# Patient Record
Sex: MALE | Race: BLACK OR AFRICAN AMERICAN | NOT HISPANIC OR LATINO | Employment: FULL TIME | URBAN - METROPOLITAN AREA
[De-identification: names, ages, dates, MRNs, and addresses within clinical notes are randomized per-mention and may not be internally consistent; named-entity substitution may affect disease eponyms.]

---

## 2018-06-04 DIAGNOSIS — M41.9 SCOLIOSIS, UNSPECIFIED SCOLIOSIS TYPE, UNSPECIFIED SPINAL REGION: Primary | ICD-10-CM

## 2018-06-06 ENCOUNTER — OFFICE VISIT (OUTPATIENT)
Dept: ORTHOPEDICS | Facility: CLINIC | Age: 28
End: 2018-06-06
Payer: COMMERCIAL

## 2018-06-06 ENCOUNTER — HOSPITAL ENCOUNTER (OUTPATIENT)
Dept: RADIOLOGY | Facility: HOSPITAL | Age: 28
Discharge: HOME | End: 2018-06-06
Attending: PHYSICIAN ASSISTANT
Payer: COMMERCIAL

## 2018-06-06 VITALS — BODY MASS INDEX: 21.55 KG/M2 | WEIGHT: 150.56 LBS | HEIGHT: 70 IN

## 2018-06-06 DIAGNOSIS — M41.9 SCOLIOSIS, UNSPECIFIED SCOLIOSIS TYPE, UNSPECIFIED SPINAL REGION: ICD-10-CM

## 2018-06-06 DIAGNOSIS — M41.9 SCOLIOSIS, UNSPECIFIED SCOLIOSIS TYPE, UNSPECIFIED SPINAL REGION: Primary | ICD-10-CM

## 2018-06-06 PROCEDURE — 99204 OFFICE O/P NEW MOD 45 MIN: CPT | Mod: S$GLB,,, | Performed by: PHYSICIAN ASSISTANT

## 2018-06-06 PROCEDURE — 99999 PR PBB SHADOW E&M-EST. PATIENT-LVL III: CPT | Mod: PBBFAC,,, | Performed by: PHYSICIAN ASSISTANT

## 2018-06-06 PROCEDURE — 3008F BODY MASS INDEX DOCD: CPT | Mod: CPTII,S$GLB,, | Performed by: PHYSICIAN ASSISTANT

## 2018-06-06 PROCEDURE — 72082 X-RAY EXAM ENTIRE SPI 2/3 VW: CPT | Mod: TC

## 2018-06-06 PROCEDURE — 72082 X-RAY EXAM ENTIRE SPI 2/3 VW: CPT | Mod: 26,,, | Performed by: RADIOLOGY

## 2018-06-06 RX ORDER — DOXYCYCLINE 100 MG/1
CAPSULE ORAL
Refills: 3 | COMMUNITY
Start: 2018-05-25 | End: 2021-02-23

## 2018-06-06 RX ORDER — MELOXICAM 15 MG/1
15 TABLET ORAL DAILY
Qty: 30 TABLET | Refills: 0 | Status: SHIPPED | OUTPATIENT
Start: 2018-06-06 | End: 2018-07-06

## 2018-06-06 NOTE — PROGRESS NOTES
DATE: 6/6/2018  PATIENT: Maxx Neff    Supervising Physician: Maco Zarco M.D.    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 The pain has been present for 7 months.  He started having pain in November and went to his PCP who told him he had scoliosis.  He has never been diagnosed with scoliosis before.  He has no known family history of scoliosis.   The patient describes the pain as burning, shooting and throbbing.  The pain is worse with walking and bending and improved by yoga. There is no associated numbness and tingling. There is subjective weakness. Prior treatments have included ibuprofen and yoga, but no physical therapy, ESIs or surgery.    The patient denies myelopathic symptoms such as handwriting changes or difficulty with buttons/coins/keys. Denies perineal paresthesias, bowel/bladder dysfunction.    PAST MEDICAL/SURGICAL HISTORY:  Past Medical History:   Diagnosis Date    Keloid cicatrix      History reviewed. No pertinent surgical history.    Current Medications:   Current Outpatient Prescriptions:     doxycycline (VIBRAMYCIN) 100 MG Cap, TK 1 C PO QD AS DIRECTED, Disp: , Rfl: 3    meloxicam (MOBIC) 15 MG tablet, Take 1 tablet (15 mg total) by mouth once daily., Disp: 30 tablet, Rfl: 0    Social History:   Social History     Social History    Marital status: Single     Spouse name: N/A    Number of children: N/A    Years of education: N/A     Occupational History    Student      Social History Main Topics    Smoking status: Never Smoker    Smokeless tobacco: Not on file    Alcohol use No    Drug use: No    Sexual activity: Not on file     Other Topics Concern    Not on file     Social History Narrative    No narrative on file       REVIEW OF SYSTEMS:  Constitution: Negative. Negative for chills, fever and night sweats.   Cardiovascular: Negative for chest pain and syncope.  "  Respiratory: Negative for cough and shortness of breath.   Gastrointestinal: See HPI. Negative for nausea/vomiting. Negative for abdominal pain.  Genitourinary: See HPI. Negative for discoloration or dysuria.  Skin: Negative for dry skin, itching and rash.   Hematologic/Lymphatic: Negative for bleeding problem. Does not bruise/bleed easily.   Musculoskeletal: Negative for falls and muscle weakness.   Neurological: See HPI. No seizures.   Endocrine: Negative for polydipsia, polyphagia and polyuria.   Allergic/Immunologic: Negative for hives and persistent infections.    PHYSICAL EXAMINATION:    Ht 5' 10" (1.778 m)   Wt 68.3 kg (150 lb 9.2 oz)   BMI 21.61 kg/m²     General: The patient is a very pleasant 27 y.o. male in no apparent distress, the patient is oriented to person, place and time.   Psych: Normal mood and affect  HEENT: Vision grossly intact, hearing intact to the spoken word.  Lungs: Respirations unlabored.  Gait: Normal station and gait, no difficulty with toe or heel walk.   Skin: Dorsal lumbar skin negative for rashes, lesions, hairy patches and surgical scars.  Range of motion: Lumbar range of motion is acceptable. There is mild lumbar tenderness to palpation.  Spinal Balance: Global saggital and coronal spinal balance acceptable, there is a right thoracolumbar prominence.  There is scoliosis.  Musculoskeletal: No pain with the range of motion of the bilateral hips. No trochanteric tenderness to palpation.  Vascular: Bilateral lower extremities warm and well perfused, Dorsalis pedis pulses 2+ bilaterally.  Neurological: Normal strength and tone in all major motor groups in the bilateral lower extremities. Normal sensation to light touch in the L2-S1 dermatomes bilaterally.  Deep tendon reflexes symmetric 2+ in the bilateral lower extremities.  Negative Babinski bilaterally.  Straight leg raise negative bilaterally.     IMAGING:   Today I personally reviewed scoli films which show a 40 degree apex " left lumbar curvature.     ASSESSMENT/PLAN:    Diagnoses and all orders for this visit:    Scoliosis, unspecified scoliosis type, unspecified spinal region  -     Ambulatory Referral to Physical/Occupational Therapy    Other orders  -     meloxicam (MOBIC) 15 MG tablet; Take 1 tablet (15 mg total) by mouth once daily.    Discussed with Dr. Zarco.  The patient is not interested in surgical intervention at this time.  Will try some conservative treatment first.  Referral for PT placed today.  Follow up in 6 weeks after therapy.          Follow-up if symptoms worsen or fail to improve.

## 2018-06-13 ENCOUNTER — CLINICAL SUPPORT (OUTPATIENT)
Dept: REHABILITATION | Facility: HOSPITAL | Age: 28
End: 2018-06-13
Attending: PHYSICIAN ASSISTANT
Payer: COMMERCIAL

## 2018-06-13 DIAGNOSIS — M54.5 LOW BACK PAIN, UNSPECIFIED BACK PAIN LATERALITY, UNSPECIFIED CHRONICITY, WITH SCIATICA PRESENCE UNSPECIFIED: ICD-10-CM

## 2018-06-13 PROBLEM — M54.50 LOW BACK PAIN: Status: ACTIVE | Noted: 2018-06-13

## 2018-06-13 PROCEDURE — 97110 THERAPEUTIC EXERCISES: CPT

## 2018-06-13 PROCEDURE — 97161 PT EVAL LOW COMPLEX 20 MIN: CPT

## 2018-06-13 NOTE — PLAN OF CARE
"                                                    Physical Therapy Initial Evaluation     Name: Maxx Neff  Clinic Number: [id number]    Diagnosis:   Encounter Diagnosis   Name Primary?    Low back pain, unspecified back pain laterality, unspecified chronicity, with sciatica presence unspecified      Physician: Soledad Gill PA-C  Treatment Orders: PT Eval and Treat  Past Medical History:   Diagnosis Date    Keloid cicatrix      Current Outpatient Prescriptions   Medication Sig    doxycycline (VIBRAMYCIN) 100 MG Cap TK 1 C PO QD AS DIRECTED    meloxicam (MOBIC) 15 MG tablet Take 1 tablet (15 mg total) by mouth once daily.     No current facility-administered medications for this visit.      Review of patient's allergies indicates:  No Known Allergies    Time In: 4:00 pm   Time Out: 4:45 pm     Evaluation Date: 6/13/18  Visit # authorized: 20  Authorization period: 12/31/18  Plan of care Expiration: 7/25/18    Subjective     Patient reports in November 2017 his mattress "went bad" so pt replaced it with a new one. The pain persisted after he got a new mattress so he went to [facility]. He found out he had scoliosis when he went to [facility]. 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 Pain is mostly local the the back, but when it is severe it will radiate into R hip and thigh. Pt reports he has felt his toes tingle when the pain was very severe, but that has only happened a couple of times. Pain is almost exclusively on the R side of low back. Pt has not had any surgeries or injections in spine. Pt denies all red flags.   Diagnostic Imaging: xray: Dextroscoliosis in the lower thoracic spine and levoscoliosis with a rotary component and lumbar spine.  Measurements to be made by orthopedics.    Pain Scale: Maxx rates pain on a scale of 0-10 to be 4 at worst; 1 currently; 0 at best .  Onset: sudden  Aggravating factors: heavy lifting, bending forward, " "prolonged static positions   Easing factors:  Moving around, staying active   Prior Therapy: none  Functional Deficits Leading to Referral: pain and limitations with functional mobility  Prior functional status: independent   Occupation:  Teacher                        Pts goals:  Learn exercises to help his back and decrease pain     Objective     Posture Alignment: slouched posture, elevated L iliac crest, R rib hump with lumbar flexion    Palpation: no TTP    LUMBAR SPINE AROM:   Flexion: 25% pain   Extension: 50% pain   Left Sidebend: 75% pain   Right Sidebend: 50% pain   Left Rotation: 25%   Right Rotation: 10%     SEGMENTAL MOBILITY: decreased     LOWER EXTREMITY STRENGTH:   Left Right   Quadriceps 5/5 4+/5   Hamstrings 5/5 5/5     Iliopsoas 4/5 4+/5   PGM 5/5 5/5   Hip IR 5/5 5/5   Hip ER 4+/5 4+/5   Hip Ext 5/5 5/5     Dermatomes: Sensation: Light Touch: Intact    FLEXIBILITY: tight hamstrings, piriformis     GAIT: Maxx displays normal gait    Pt/family was provided educational information, including: role of PT, goals for PT, scheduling - pt verbalized understanding. Discussed insurance limitations with pt.     TREATMENT     PT Evaluation Completed? Yes  Discussed Plan of Care with patient: Yes    Maxx received 10 minutes of therapeutic exercise including:  PPT 2x10 5"  Piriformis stretch 3x30"  Scapular retraction 2x10 5"    Written Home Exercises Provided: exercises listed above (see handout)   Maxx demo good understanding of the education provided. Patient demo good return demo of skill of exercises.    Assessment     History  Co-morbidities and personal factors that may impact the plan of care Examination  Body Structures and Functions, activity limitations and participation restrictions that may impact the plan of care    Clinical Presentation   Co-morbidities:   none        Personal Factors:   no deficits Body Regions:   back    Body Systems:    gross symmetry  ROM  strength  gross " coordinated movement  transfers  transitions            Participation Restrictions:   Pain with functional mobility      Activity limitations:   Learning and applying knowledge  no deficits    General Tasks and Commands  no deficits    Communication  no deficits    Mobility  lifting and carrying objects    Self care  washing oneself (bathing, drying, washing hands)  dressing  no deficits    Domestic Life  doing house work (cleaning house, washing dishes, laundry)    Interactions/Relationships  no deficits    Life Areas  no deficits    Community and Social Life  community life  recreation and leisure         stable and uncomplicated                      low   low  low Decision Making/ Complexity Score:  low     Patient with scoliosis presents with decreased strength, decreased ROM, poor postural awareness, and subsequent functional limitations. Pt will benefit from PT to address these deficits. Good tolerance to exercises today with no adverse reaction.   Pt prognosis is Good.  Pt will benefit from skilled outpatient physical therapy to address the above stated deficits, provide pt/family education and to maximize pt's level of independence.     Medical necessity is demonstrated by the following IMPAIRMENTS/PROBLEMS:  1. Increased Pain  2. Decreased Segmental Mobility & Decreased ROM  3. Decreased Core & BLE strength  4. Decreased Flexibility BLE  5. Decreased Tolerance to Functional Activities    Pt's spiritual, cultural and educational needs considered and pt agreeable to plan of care and goals as stated below:     Anticipated Barriers for physical therapy: scoliosis     Short Term GOALS: 3 weeks. Pt agrees with goals set.  1. Patient demonstrates independence with HEP.   2. Patient demonstrates independence with Postural Awareness.   3. Patient demonstrates independence with body mechanics.     Long Term GOALS: 6 weeks. Pt agrees with goals set.  1. Patient demonstrates increased lumbar AROM by 25% to improve  tolerance to functional activities.   2. Patient demonstrates increased strength BLE's to 5/5 or greater to improve tolerance to functional activities.   3. Patient demonstrates improved overall function per FOTO Lumbar Survey to 33% or less.     Functional Limitations Reports   Tool: FOTO Lumbar Survey  Score: 43% Limitation    PLAN     Outpatient physical therapy 2 times weekly to include: pt ed, hep, therapeutic exercises, neuromuscular re-education/ balance exercises, joint mobilizations, aquatic therapy and modalities prn. Cont PT for  6 weeks. Pt may be seen by PTA as part of the rehabilitation team.     Therapist: Ahmet Cleveland, PT  6/13/2018

## 2018-06-19 ENCOUNTER — CLINICAL SUPPORT (OUTPATIENT)
Dept: REHABILITATION | Facility: HOSPITAL | Age: 28
End: 2018-06-19
Attending: PHYSICIAN ASSISTANT
Payer: COMMERCIAL

## 2018-06-19 DIAGNOSIS — M54.5 LOW BACK PAIN, UNSPECIFIED BACK PAIN LATERALITY, UNSPECIFIED CHRONICITY, WITH SCIATICA PRESENCE UNSPECIFIED: Primary | ICD-10-CM

## 2018-06-19 PROCEDURE — 97110 THERAPEUTIC EXERCISES: CPT

## 2018-06-19 NOTE — PROGRESS NOTES
"                                                    Physical Therapy Daily Note     Name: Maxx Neff  Clinic Number: [id number]  Diagnosis:   Encounter Diagnosis   Name Primary?    Low back pain, unspecified back pain laterality, unspecified chronicity, with sciatica presence unspecified Yes     Physician: Soledad Gill PA-C  Precautions: scoliosis  Visit #: 2 of 12  PTA Visit #: 1  Time In: 8:33 a  Time Out: 9:42 a  Total Treatment Time 1:1: 30    Evaluation Date: 6/13/18  Plan of care Expiration: 7/25/18      Subjective     Pt reports: no pain right now  Pain Scale: Maxx rates pain at LB on a scale of 0-10 to be 0 currently.    Objective     Maxx received individual therapeutic exercises to develop strength, ROM, flexibility, posture and core stabilization for 59 minutes including:  Bike x 5 min  Rows OTB 2x10  Shoulder ext OTB 2x10  Core walkouts GSC 10x  Shuttle 2 c 2x10    HS S 3x30"  PF S 3x30"  R SAQ 2x10 #2  LTR 10x10"  DKTC 3x10  PPT 2x10  SLR 2x10  CLams 2x10  Prone Press up 2x10        Maxx received the following manual therapy techniques: NT      The patient received the following direct contact modalities after being cleared for contraindications:HP to LS x 10 min      Written Home Exercises Provided: as per eval  Pt demo good understanding of the education provided. Maxx demonstrated good return demonstration of activities.     Education provided re: DOMS, performing prone press ups  Maxx verbalized good understanding of education provided.   No spiritual or educational barriers to learning provided    Assessment     Patient tolerated te well w/o adverse reaction. Pt felt a little discomfort LB w/ shoulder ext. Pt states that prone ext help to alleviate pain. ED pt on performing prone press ups throughout the day. Pt will benefit from skilled PT.  This is a 27 y.o. male referred to outpatient phLBPysical therapy and presents with a medical diagnosis of LBP and demonstrates " limitations as described in the problem list. Pt prognosis is Good. Pt will continue to benefit from skilled outpatient physical therapy to address the deficits listed in the problem list, provide pt/family education and to maximize pt's level of independence in the home and community environment.     Goals as follows:  Short Term GOALS: 3 weeks. Pt agrees with goals set.  1. Patient demonstrates independence with HEP.   2. Patient demonstrates independence with Postural Awareness.   3. Patient demonstrates independence with body mechanics.      Long Term GOALS: 6 weeks. Pt agrees with goals set.  1. Patient demonstrates increased lumbar AROM by 25% to improve tolerance to functional activities.   2. Patient demonstrates increased strength BLE's to 5/5 or greater to improve tolerance to functional activities.   3. Patient demonstrates improved overall function per FOTO Lumbar Survey to 33% or less.         Plan     Continue with established Plan of Care towards PT goals.    Therapist: Kristina Samayoa PTA  6/19/2018    Ahmet Cleveland PT and Kristina Samayoa PTA met face to face to discuss patient's treatment plan and progress towards established goals.  Treatment will be continued as described in initial report/eval and progress notes. Patient will be seen by physical therapist every sixth visit and minimally once per month.

## 2018-06-22 ENCOUNTER — CLINICAL SUPPORT (OUTPATIENT)
Dept: REHABILITATION | Facility: HOSPITAL | Age: 28
End: 2018-06-22
Attending: PHYSICIAN ASSISTANT
Payer: COMMERCIAL

## 2018-06-22 DIAGNOSIS — M54.5 LOW BACK PAIN, UNSPECIFIED BACK PAIN LATERALITY, UNSPECIFIED CHRONICITY, WITH SCIATICA PRESENCE UNSPECIFIED: Primary | ICD-10-CM

## 2018-06-22 PROCEDURE — 97110 THERAPEUTIC EXERCISES: CPT

## 2018-06-22 NOTE — PROGRESS NOTES
Physical Therapy Daily Note     Name: Maxx Neff  Clinic Number: [id number]  Diagnosis:   Encounter Diagnosis   Name Primary?    Low back pain, unspecified back pain laterality, unspecified chronicity, with sciatica presence unspecified Yes     Physician: Soledad Gill PA-C  Precautions: scoliosis  Visit #: 3 of 12  PTA Visit #: 2  Time In: 8:27 a  Time Out: 8:33 a  Total Treatment Time 1:1: 38    Evaluation Date: 6/13/18  Plan of care Expiration: 7/25/18      Subjective     Pt reports: no pain, little pain after last session. Better after prone press ups  Pain Scale: Maxx rates pain at LB on a scale of 0-10 to be 0 currently.    Objective     Maxx received individual therapeutic exercises to develop strength, ROM, flexibility, posture and core stabilization for 56 minutes including:  Bike x 8 min lvl2  Rows OTB 3x10  Shoulder ext OTB 3x10  Core walkouts GSC 10x2  Shuttle 2 c 3x10    HS S 2x1'  PF S 2x1'  R SAQ 3x10 #2  DKTC 3x10  PPT 3x10  SLR 3x10  CLams 3x10  Prone Press up 3x10        Maxx received the following manual therapy techniques: NT      The patient received the following direct contact modalities after being cleared for contraindications:HP to LS x 10 min      Written Home Exercises Provided: as per eval  Pt demo good understanding of the education provided. Maxx demonstrated good return demonstration of activities.     Education provided re: DOMS, performing prone press ups  Maxx verbalized good understanding of education provided.   No spiritual or educational barriers to learning provided    Assessment     Patient tolerated te well w/o adverse reaction. LTR taken out 2* causing increase symptoms last session. VC needed to not rot hips w/ clams. Increased reps today. Cont to benefit from skilled PT  This is a 27 y.o. male referred to outpatient phLBPysical therapy and presents with a medical diagnosis of LBP and  demonstrates limitations as described in the problem list. Pt prognosis is Good. Pt will continue to benefit from skilled outpatient physical therapy to address the deficits listed in the problem list, provide pt/family education and to maximize pt's level of independence in the home and community environment.     Goals as follows:  Short Term GOALS: 3 weeks. Pt agrees with goals set.  1. Patient demonstrates independence with HEP.   2. Patient demonstrates independence with Postural Awareness.   3. Patient demonstrates independence with body mechanics.      Long Term GOALS: 6 weeks. Pt agrees with goals set.  1. Patient demonstrates increased lumbar AROM by 25% to improve tolerance to functional activities.   2. Patient demonstrates increased strength BLE's to 5/5 or greater to improve tolerance to functional activities.   3. Patient demonstrates improved overall function per FOTO Lumbar Survey to 33% or less.         Plan     Continue with established Plan of Care towards PT goals.    Therapist: Kristina Samayoa PTA  6/22/2018    Ahmet Cleveland PT and Kristina Samayoa PTA met face to face to discuss patient's treatment plan and progress towards established goals.  Treatment will be continued as described in initial report/eval and progress notes. Patient will be seen by physical therapist every sixth visit and minimally once per month.

## 2018-06-26 ENCOUNTER — CLINICAL SUPPORT (OUTPATIENT)
Dept: REHABILITATION | Facility: HOSPITAL | Age: 28
End: 2018-06-26
Attending: PHYSICIAN ASSISTANT
Payer: COMMERCIAL

## 2018-06-26 DIAGNOSIS — M54.5 LOW BACK PAIN, UNSPECIFIED BACK PAIN LATERALITY, UNSPECIFIED CHRONICITY, WITH SCIATICA PRESENCE UNSPECIFIED: Primary | ICD-10-CM

## 2018-06-26 PROCEDURE — 97110 THERAPEUTIC EXERCISES: CPT

## 2018-06-26 NOTE — PROGRESS NOTES
Physical Therapy Daily Note     Name: Maxx Neff  Clinic Number: [id number]  Diagnosis:   Encounter Diagnosis   Name Primary?    Low back pain, unspecified back pain laterality, unspecified chronicity, with sciatica presence unspecified Yes     Physician: Soledad Gill PA-C  Precautions: scoliosis  Visit #: 4 of 12  PTA Visit #: 3  Time In: 7:54 a  Time Out: 9:14 a  Total Treatment Time 1:1: 30    Evaluation Date: 6/13/18  Plan of care Expiration: 7/25/18      Subjective     Pt reports: LB getting better. Little pain getting out of bed this am but not pain now  Pain Scale: Maxx rates pain at LB on a scale of 0-10 to be 0 currently.    Objective     Maxx received individual therapeutic exercises to develop strength, ROM, flexibility, posture and core stabilization for 68  minutes including:  Bike x 8 min lvl3  Rows GTB 3x10  Shoulder ext GTB 3x10  Core walkouts GSC 10x2  Shuttle 2 c 3x10    HS S 2x1'  PF S 2x1'  R SAQ 3x10 #2  DKTC 3x10  PPT 3x10  SLR 3x10  CLams 3x10  Prone Press up 3x10        Maxx received the following manual therapy techniques: NT      The patient received the following direct contact modalities after being cleared for contraindications:HP to LS x 10 min      Written Home Exercises Provided: as per eval  Pt demo good understanding of the education provided. Maxx demonstrated good return demonstration of activities.     Education provided re: DOMS, performing prone press ups  Maxx verbalized good understanding of education provided.   No spiritual or educational barriers to learning provided    Assessment     Patient tolerated te well w/o adverse reaction. Pt mainly c/o pain when he rot to R. Cont to have improvement w/ decreased pain. Pt can be advanced next session as sharan. Increased band resistance today. Cont to benefit from skilled PT  This is a 27 y.o. male referred to outpatient phLBPysical therapy and presents  with a medical diagnosis of LBP and demonstrates limitations as described in the problem list. Pt prognosis is Good. Pt will continue to benefit from skilled outpatient physical therapy to address the deficits listed in the problem list, provide pt/family education and to maximize pt's level of independence in the home and community environment.     Goals as follows:  Short Term GOALS: 3 weeks. Pt agrees with goals set.  1. Patient demonstrates independence with HEP.   2. Patient demonstrates independence with Postural Awareness.   3. Patient demonstrates independence with body mechanics.      Long Term GOALS: 6 weeks. Pt agrees with goals set.  1. Patient demonstrates increased lumbar AROM by 25% to improve tolerance to functional activities.   2. Patient demonstrates increased strength BLE's to 5/5 or greater to improve tolerance to functional activities.   3. Patient demonstrates improved overall function per FOTO Lumbar Survey to 33% or less.         Plan     Continue with established Plan of Care towards PT goals.    Therapist: Kristina Samayoa PTA  6/26/2018    Ahmet Cleveland PT and Kristina Samayoa PTA met face to face to discuss patient's treatment plan and progress towards established goals.  Treatment will be continued as described in initial report/eval and progress notes. Patient will be seen by physical therapist every sixth visit and minimally once per month.

## 2018-06-28 ENCOUNTER — CLINICAL SUPPORT (OUTPATIENT)
Dept: REHABILITATION | Facility: HOSPITAL | Age: 28
End: 2018-06-28
Attending: PHYSICIAN ASSISTANT
Payer: COMMERCIAL

## 2018-06-28 DIAGNOSIS — M54.5 LOW BACK PAIN, UNSPECIFIED BACK PAIN LATERALITY, UNSPECIFIED CHRONICITY, WITH SCIATICA PRESENCE UNSPECIFIED: Primary | ICD-10-CM

## 2018-06-28 PROCEDURE — 97110 THERAPEUTIC EXERCISES: CPT

## 2018-06-28 NOTE — PROGRESS NOTES
Physical Therapy Daily Note     Name: Maxx Neff  Clinic Number: [id number]  Diagnosis:   Encounter Diagnosis   Name Primary?    Low back pain, unspecified back pain laterality, unspecified chronicity, with sciatica presence unspecified Yes     Physician: Soledad Gill PA-C  Precautions: scoliosis  Visit #: 5 of 12  PTA Visit #: 4  Time In: 8:26 a  Time Out: 9:30 a  Total Treatment Time 1:1: 36    Evaluation Date: 6/13/18  Plan of care Expiration: 7/25/18      Subjective     Pt reports: LB doing better, yoga is helping  Pain Scale: Maxx rates pain at LB on a scale of 0-10 to be 0 currently.    Objective     Maxx received individual therapeutic exercises to develop strength, ROM, flexibility, posture and core stabilization for 56 minutes including:  Bike x 8 min lvl3  Rows GTB 3x10  Shoulder ext GTB 3x10  Core walkouts GSC 10x2  Shuttle 3 c 3x10  Seated on SB march 2x10  Stand on bosu chops 15x    HS S 2x1'  PF S 2x1'  R SAQ 3x10 #2  DKTC 3x10  PPT 2x10 W/ LE ext  SLR 3x10  CLams 3x10 RTB  Prone Press up 3x10  Bridge w/ ball 2x10  QPED opp arm/leg 2x10        Maxx received the following manual therapy techniques: NT      The patient received the following direct contact modalities after being cleared for contraindications:HP to LS x 10 min      Written Home Exercises Provided: as per eval  Pt demo good understanding of the education provided. Maxx demonstrated good return demonstration of activities.     Education provided re: DOMS, performing prone press ups  Maxx verbalized good understanding of education provided.   No spiritual or educational barriers to learning provided    Assessment     Patient tolerated te well w/o adverse reaction. Pt felt the new fallon were beneficial today w/ core strengthening. VC for proper mm activation. Prone press up not performed 2* time, will perform at home.Cont to benefit from skilled PT  This is a 27  y.o. male referred to outpatient phLBPysical therapy and presents with a medical diagnosis of LBP and demonstrates limitations as described in the problem list. Pt prognosis is Good. Pt will continue to benefit from skilled outpatient physical therapy to address the deficits listed in the problem list, provide pt/family education and to maximize pt's level of independence in the home and community environment.     Goals as follows:  Short Term GOALS: 3 weeks. Pt agrees with goals set.  1. Patient demonstrates independence with HEP.   2. Patient demonstrates independence with Postural Awareness.   3. Patient demonstrates independence with body mechanics.      Long Term GOALS: 6 weeks. Pt agrees with goals set.  1. Patient demonstrates increased lumbar AROM by 25% to improve tolerance to functional activities.   2. Patient demonstrates increased strength BLE's to 5/5 or greater to improve tolerance to functional activities.   3. Patient demonstrates improved overall function per FOTO Lumbar Survey to 33% or less.         Plan     Continue with established Plan of Care towards PT goals.    Therapist: Kristina Samayoa PTA  6/28/2018    Ahmet Cleveland PT and Kristina Samayoa PTA met face to face to discuss patient's treatment plan and progress towards established goals.  Treatment will be continued as described in initial report/eval and progress notes. Patient will be seen by physical therapist every sixth visit and minimally once per month.

## 2018-07-05 ENCOUNTER — CLINICAL SUPPORT (OUTPATIENT)
Dept: REHABILITATION | Facility: HOSPITAL | Age: 28
End: 2018-07-05
Attending: PHYSICIAN ASSISTANT
Payer: COMMERCIAL

## 2018-07-05 PROCEDURE — 97110 THERAPEUTIC EXERCISES: CPT

## 2018-07-05 NOTE — PROGRESS NOTES
Physical Therapy Daily Note     Name: Maxx Neff  Clinic Number: [id number]  Diagnosis:   No diagnosis found.  Physician: Soledad Gill PA-C  Precautions: scoliosis  Visit #: 6 of 12  PTA Visit #: 5  Time In: 8:06 a  Time Out: 9:10 a  Total Treatment Time 1:1: 23    Evaluation Date: 6/13/18  Plan of care Expiration: 7/25/18      Subjective     Pt reports: LB is ok  Pain Scale: Maxx rates pain at LB on a scale of 0-10 to be 0 currently.    Objective   Foto Score: 28%    Mxax received individual therapeutic exercises to develop strength, ROM, flexibility, posture and core stabilization for 66 minutes including:  Bike x 8 min lvl3 (5 min today, running late)  Rows GTB 3x10  Shoulder ext GTB 3x10  Core walkouts GSC 10x2   Shuttle 3 c 3x10  Seated on SB march 3x10  Stand on bosu chops 30x GTB    HS S 2x1'  PF S 2x1'  R SAQ 3x10 #2  DKTC w/ SB 3x10  PPT 2x10 W/ LE ext  SLR 3x10 -NT  CLams 3x10 RTB-NT  Prone Press up 3x10-NT  Bridge w/ ball 2x10  QPED opp arm/leg 2x10        Maxx received the following manual therapy techniques: NT      The patient received the following direct contact modalities after being cleared for contraindications:HP to LS x 00 min      Written Home Exercises Provided: as per eval  Pt demo good understanding of the education provided. Maxx demonstrated good return demonstration of activities.     Education provided re: DOMS, performing prone press ups  Maxx verbalized good understanding of education provided.   No spiritual or educational barriers to learning provided    Assessment     Patient tolerated te well w/o adverse reaction. Pt still has pain w/ Rot as well as sometimes coming out of seated position. Some fallon not performed 2* time. Pt OOT next week, will return following week. Cont to benefit form skilled PT  This is a 27 y.o. male referred to outpatient phLBPysical therapy and presents with a medical diagnosis  of LBP and demonstrates limitations as described in the problem list. Pt prognosis is Good. Pt will continue to benefit from skilled outpatient physical therapy to address the deficits listed in the problem list, provide pt/family education and to maximize pt's level of independence in the home and community environment.     Goals as follows:  Short Term GOALS: 3 weeks. Pt agrees with goals set.  1. Patient demonstrates independence with HEP.   2. Patient demonstrates independence with Postural Awareness.   3. Patient demonstrates independence with body mechanics.      Long Term GOALS: 6 weeks. Pt agrees with goals set.  1. Patient demonstrates increased lumbar AROM by 25% to improve tolerance to functional activities.   2. Patient demonstrates increased strength BLE's to 5/5 or greater to improve tolerance to functional activities.   3. Patient demonstrates improved overall function per FOTO Lumbar Survey to 33% or less.         Plan     Continue with established Plan of Care towards PT goals.    Therapist: Kristina Samayoa PTA  7/5/2018    Ahmet Cleveland PT and Kristina Samayoa PTA met face to face to discuss patient's treatment plan and progress towards established goals.  Treatment will be continued as described in initial report/eval and progress notes. Patient will be seen by physical therapist every sixth visit and minimally once per month.

## 2018-09-26 ENCOUNTER — DOCUMENTATION ONLY (OUTPATIENT)
Dept: REHABILITATION | Facility: HOSPITAL | Age: 28
End: 2018-09-26

## 2018-09-26 NOTE — PROGRESS NOTES
Name: Maxx Neff  Referring Provider: SCOTT Gill  PT Order: PT evaluate and treat  Clinical #: [id number]  Discharge Summary Date: 9/26/2018  Diagnosis: LBP    Patient was seen for 6 OP PT visits from 6/13/18 to 7/5/18. Pt missed/no visit 6 scheduled sessions. Treatment included: evaluation, HEP, pt education, manual therapy, ther ex, and stretching. PT unable to fully assess goal achievement as pt did not return for follow up sessions/did not reschedule follow up visits. This patient is discharged from OP PT Services.    Soledad Poe, PT, DPT

## 2019-06-10 ENCOUNTER — OFFICE VISIT (OUTPATIENT)
Dept: PSYCHIATRY | Facility: CLINIC | Age: 29
End: 2019-06-10
Payer: COMMERCIAL

## 2019-06-10 VITALS
WEIGHT: 156.94 LBS | DIASTOLIC BLOOD PRESSURE: 63 MMHG | HEIGHT: 70 IN | HEART RATE: 67 BPM | BODY MASS INDEX: 22.47 KG/M2 | SYSTOLIC BLOOD PRESSURE: 141 MMHG

## 2019-06-10 DIAGNOSIS — F33.1 MDD (MAJOR DEPRESSIVE DISORDER), RECURRENT EPISODE, MODERATE: Primary | ICD-10-CM

## 2019-06-10 DIAGNOSIS — G47.00 INSOMNIA, UNSPECIFIED TYPE: ICD-10-CM

## 2019-06-10 PROCEDURE — 3008F PR BODY MASS INDEX (BMI) DOCUMENTED: ICD-10-PCS | Mod: CPTII,S$GLB,, | Performed by: NURSE PRACTITIONER

## 2019-06-10 PROCEDURE — 99999 PR PBB SHADOW E&M-EST. PATIENT-LVL III: CPT | Mod: PBBFAC,,, | Performed by: NURSE PRACTITIONER

## 2019-06-10 PROCEDURE — 3008F BODY MASS INDEX DOCD: CPT | Mod: CPTII,S$GLB,, | Performed by: NURSE PRACTITIONER

## 2019-06-10 PROCEDURE — 99204 PR OFFICE/OUTPT VISIT, NEW, LEVL IV, 45-59 MIN: ICD-10-PCS | Mod: S$GLB,,, | Performed by: NURSE PRACTITIONER

## 2019-06-10 PROCEDURE — 99204 OFFICE O/P NEW MOD 45 MIN: CPT | Mod: S$GLB,,, | Performed by: NURSE PRACTITIONER

## 2019-06-10 PROCEDURE — 99999 PR PBB SHADOW E&M-EST. PATIENT-LVL III: ICD-10-PCS | Mod: PBBFAC,,, | Performed by: NURSE PRACTITIONER
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
## 2019-06-10 NOTE — PATIENT INSTRUCTIONS
Melatonin oral solid dosage forms  What is this medicine?  MELATONIN (beatriz sherrill aguero) is a dietary supplement. It is mostly promoted to help maintain normal sleep patterns. The FDA has not approved this supplement for any medical use.  This supplement may be used for other purposes; ask your health care provider or pharmacist if you have questions.  How should I use this medicine?  Take this supplement by mouth with a glass of water. Do not take with food. This supplement is usually taken 1 or 2 hours before bedtime. After taking this supplement, limit your activities to those needed to prepare for bed. Some products may be chewed or dissolved in the mouth before swallowing. Some tablets or capsules must be swallowed whole; do not cut, crush or chew. Follow the directions on the package labeling, or take as directed by your health care professional. Do not take this supplement more often than directed.  Talk to your pediatrician regarding the use of this supplement in children. Special care may be needed. This supplement is not recommended for use in children without a prescription.  What side effects may I notice from receiving this medicine?  Side effects that you should report to your doctor or health care professional as soon as possible:  · allergic reactions like skin rash, itching or hives, swelling of the face, lips, or tongue  · breathing problems  · change in sex drive or performance  · confusion  · depressed mood, irritable, or other changes in moods or behaviors  · fast, irregular heartbeat  · feeling faint or lightheaded, falls  · irregular or missed menstrual periods  · leakage of milk from the nipples in a person who is not breast-feeding  · signs and symptoms of liver injury like dark yellow or brown urine; general ill feeling or flu-like symptoms; light-colored stools; loss of appetite; nausea; right upper belly pain; unusually weak or tired; yellowing of the eyes or  skin  · sleep-walking  · trouble staying awake or alert during the day  · unusual activities while you are still asleep like driving, eating, making phone calls  · unusual bleeding or bruising  Side effects that usually do not require medical attention (report to your doctor or health care professional if they continue or are bothersome):  · dizziness  · drowsiness  · headache  · tiredness  · unusual dreams or nightmares  · upset stomach  What may interact with this medicine?  Do not take this medicine with any of the following medications:  · fluvoxamine  · ramelteon  · tasimelteon  This medicine may also interact with the following medications:  · alcohol  · atazanavir  · caffeine  · carbamazepine  · certain antibiotics like ciprofloxacin, enoxacin  · certain medicines for depression, anxiety, or psychotic disturbances  · cimetidine  · female hormones, like estrogens and birth control pills, patches, rings, or injections  · methoxsalen  · nifedipine  · other medications for sleep  · other herbal or dietary supplements  · phenobarbital  · rifampin  · smoking tobacco  · tamoxifen  · treatments for cancer, organ transplant, or immune disorders  What if I miss a dose?  If you miss taking your dose at the usual time, skip that dose. If it is almost time for your next dose, take only that dose. Do not take double or extra doses.  Where should I keep my medicine?  Keep out of the reach of children.  Store at room temperature or as directed on the package label. Protect from moisture. Throw away any unused supplement after the expiration date.  What should I tell my health care provider before I take this medicine?  They need to know if you have any of these conditions:  · asthma  · cancer  · depression or mental illness  · diabetes  · hormone problems  · if you often drink alcohol  · immune system problems  · liver disease  · organ transplant  · seizure disorder  · an unusual or allergic reaction to melatonin, other  medicines, foods, dyes, or preservatives  · pregnant or trying to get pregnant  · breast-feeding  What should I watch for while using this medicine?  See your doctor if your symptoms do not get better or if they get worse. Do not take this supplement for more than 2 weeks unless your doctor tells you to.  You may get drowsy or dizzy. Do not drive, use machinery, or do anything that needs mental alertness until you know how this medicine affects you. Do not stand or sit up quickly, especially if you are an older patient. This reduces the risk of dizzy or fainting spells. Alcohol may interfere with the effect of this medicine. Avoid alcoholic drinks.  Talk to your doctor before you use this supplement if you are currently being treated for an emotional, mental, or sleep problem. This medicine may interfere with your treatment.  Herbal or dietary supplements are not regulated like medicines. Rigid  standards are not required for dietary supplements. The purity and strength of these products can vary. The safety and effect of this dietary supplement for a certain disease or illness is not well known. This product is not intended to diagnose, treat, cure or prevent any disease.  The Food and Drug Administration suggests the following to help consumers protect themselves:  · Always read product labels and follow directions.  · Natural does not mean a product is safe for humans to take.  · Look for products that include USP after the ingredient name. This means that the  followed the standards of the [place] Pharmacopoeia.  · Supplements made or sold by a nationally known food or drug company are more likely to be made under tight controls. You can write to the company for more information about how the product was made.  NOTE:This sheet is a summary. It may not cover all possible information. If you have questions about this medicine, talk to your doctor, pharmacist, or health care provider.  Copyright© 2017 [employer]

## 2019-09-05 ENCOUNTER — OFFICE VISIT (OUTPATIENT)
Dept: PSYCHIATRY | Facility: CLINIC | Age: 29
End: 2019-09-05
Payer: COMMERCIAL

## 2019-09-05 DIAGNOSIS — F32.A DEPRESSION, UNSPECIFIED DEPRESSION TYPE: Primary | ICD-10-CM

## 2019-09-05 PROCEDURE — 90791 PSYCH DIAGNOSTIC EVALUATION: CPT | Mod: S$GLB,,, | Performed by: SOCIAL WORKER

## 2019-09-05 PROCEDURE — 90791 PR PSYCHIATRIC DIAGNOSTIC EVALUATION: ICD-10-PCS | Mod: S$GLB,,, | Performed by: SOCIAL WORKER
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
## 2019-11-29 ENCOUNTER — OFFICE VISIT (OUTPATIENT)
Dept: URGENT CARE | Facility: CLINIC | Age: 29
End: 2019-11-29
Payer: COMMERCIAL

## 2019-11-29 VITALS
DIASTOLIC BLOOD PRESSURE: 75 MMHG | HEIGHT: 70 IN | SYSTOLIC BLOOD PRESSURE: 132 MMHG | BODY MASS INDEX: 22.33 KG/M2 | HEART RATE: 104 BPM | RESPIRATION RATE: 19 BRPM | OXYGEN SATURATION: 98 % | WEIGHT: 156 LBS | TEMPERATURE: 101 F

## 2019-11-29 DIAGNOSIS — K52.9 GASTROENTERITIS: Primary | ICD-10-CM

## 2019-11-29 DIAGNOSIS — R50.9 FEVER, UNSPECIFIED FEVER CAUSE: ICD-10-CM

## 2019-11-29 LAB
CTP QC/QA: YES
FLUAV AG NPH QL: NEGATIVE
FLUBV AG NPH QL: NEGATIVE

## 2019-11-29 PROCEDURE — 99214 PR OFFICE/OUTPT VISIT, EST, LEVL IV, 30-39 MIN: ICD-10-PCS | Mod: S$GLB,,, | Performed by: FAMILY MEDICINE

## 2019-11-29 PROCEDURE — 87804 POCT INFLUENZA A/B: ICD-10-PCS | Mod: QW,S$GLB,, | Performed by: FAMILY MEDICINE

## 2019-11-29 PROCEDURE — 87804 INFLUENZA ASSAY W/OPTIC: CPT | Mod: 59,QW,S$GLB, | Performed by: FAMILY MEDICINE

## 2019-11-29 PROCEDURE — 99214 OFFICE O/P EST MOD 30 MIN: CPT | Mod: S$GLB,,, | Performed by: FAMILY MEDICINE

## 2019-11-29 RX ORDER — ONDANSETRON 4 MG/1
4 TABLET, ORALLY DISINTEGRATING ORAL EVERY 8 HOURS PRN
Qty: 20 TABLET | Refills: 0 | Status: SHIPPED | OUTPATIENT
Start: 2019-11-29 | End: 2019-12-06

## 2019-11-29 RX ORDER — DIPHENOXYLATE HYDROCHLORIDE AND ATROPINE SULFATE 2.5; .025 MG/1; MG/1
1 TABLET ORAL 3 TIMES DAILY PRN
Qty: 20 TABLET | Refills: 0 | Status: SHIPPED | OUTPATIENT
Start: 2019-11-29 | End: 2019-12-06

## 2019-11-29 NOTE — PATIENT INSTRUCTIONS

## 2019-11-29 NOTE — PROGRESS NOTES
"Subjective:       Patient ID: Maxx Neff is a 29 y.o. male.    Vitals:  height is 5' 10" (1.778 m) and weight is 70.8 kg (156 lb). His oral temperature is 100.8 °F (38.2 °C) (abnormal). His blood pressure is 132/75 and his pulse is 104. His respiration is 19 and oxygen saturation is 98%.     Chief Complaint: Emesis    Emesis    This is a new problem. The current episode started today. The problem occurs 2 to 4 times per day. The emesis has an appearance of stomach contents. The maximum temperature recorded prior to his arrival was 100.4 - 100.9 F. Associated symptoms include diarrhea. Pertinent negatives include no abdominal pain, arthralgias, chest pain, chills, coughing, decreased urine volume, dizziness, fever, headaches, myalgias, sweats, URI or weight loss. He has tried nothing for the symptoms. The treatment provided no relief.       Constitution: Negative for chills, fatigue and fever.   HENT: Negative for congestion and sore throat.    Neck: Negative for painful lymph nodes.   Cardiovascular: Negative for chest pain and leg swelling.   Eyes: Negative for double vision and blurred vision.   Respiratory: Negative for cough and shortness of breath.    Gastrointestinal: Positive for vomiting and diarrhea. Negative for abdominal pain and nausea.   Genitourinary: Negative for dysuria, frequency, urgency and urine decreased.   Musculoskeletal: Negative for joint pain, joint swelling, muscle cramps and muscle ache.   Skin: Negative for color change, pale and rash.   Allergic/Immunologic: Negative for seasonal allergies.   Neurological: Negative for dizziness, history of vertigo, light-headedness, passing out and headaches.   Hematologic/Lymphatic: Negative for swollen lymph nodes, easy bruising/bleeding and history of blood clots. Does not bruise/bleed easily.   Psychiatric/Behavioral: Negative for nervous/anxious, sleep disturbance and depression. The patient is not nervous/anxious.        Objective:    "   Physical Exam   Constitutional: He is oriented to person, place, and time. He appears well-developed and well-nourished. He is cooperative.  Non-toxic appearance. He does not appear ill. No distress.   HENT:   Head: Normocephalic and atraumatic.   Right Ear: Hearing, tympanic membrane, external ear and ear canal normal.   Left Ear: Hearing, tympanic membrane, external ear and ear canal normal.   Nose: No mucosal edema, rhinorrhea or nasal deformity. No epistaxis. Right sinus exhibits no maxillary sinus tenderness and no frontal sinus tenderness. Left sinus exhibits no maxillary sinus tenderness and no frontal sinus tenderness.   Mouth/Throat: Uvula is midline, oropharynx is clear and moist and mucous membranes are normal. No trismus in the jaw. Normal dentition. No uvula swelling. No posterior oropharyngeal erythema.   Eyes: Conjunctivae and lids are normal. Right eye exhibits no discharge. Left eye exhibits no discharge. No scleral icterus.   Neck: Trachea normal, normal range of motion, full passive range of motion without pain and phonation normal. Neck supple.   Cardiovascular: Normal rate, regular rhythm, normal heart sounds, intact distal pulses and normal pulses.   Pulmonary/Chest: Effort normal and breath sounds normal. No respiratory distress. He has no wheezes. He has no rales.   Abdominal: Soft. Normal appearance and bowel sounds are normal. He exhibits no distension and no pulsatile midline mass. There is no rebound and no guarding. No hernia.   Mild mid right abdominal pain.   Musculoskeletal: Normal range of motion. He exhibits no edema or deformity.   Neurological: He is alert and oriented to person, place, and time. He exhibits normal muscle tone. Coordination normal.   Skin: Skin is warm, dry, intact, not diaphoretic and not pale.   Psychiatric: He has a normal mood and affect. His speech is normal and behavior is normal. Judgment and thought content normal. Cognition and memory are normal.    Nursing note and vitals reviewed.        Assessment:       1. Gastroenteritis    2. Fever, unspecified fever cause        Plan:         Gastroenteritis  -     ondansetron (ZOFRAN-ODT) 4 MG TbDL; Take 1 tablet (4 mg total) by mouth every 8 (eight) hours as needed (nausea/vomit).  Dispense: 20 tablet; Refill: 0  -     diphenoxylate-atropine 2.5-0.025 mg (LOMOTIL) 2.5-0.025 mg per tablet; Take 1 tablet by mouth 3 (three) times daily as needed for Diarrhea.  Dispense: 20 tablet; Refill: 0    Fever, unspecified fever cause  -     POCT Influenza A/B         Patient Instructions     Viral Gastroenteritis (Adult)    Gastroenteritis is commonly called the stomach flu. It is most often caused by a virus that affects the stomach and intestinal tract and usually lasts from 2 to 7 days. Common viruses causing gastroenteritis include norovirus, rotavirus, and hepatitis A. Non-viral causes of gastroenteritis include bacteria, parasites, and toxins.  The danger from repeated vomiting or diarrhea is dehydration. This is the loss of too much fluid from the body. When this occurs, body fluids must be replaced. Antibiotics do not help with this illness because it is usually viral.Simple home treatment will be helpful.  Symptoms of viral gastroenteritis may include:  · Watery, loose stools  · Stomach pain or abdominal cramps  · Fever and chills  · Nausea and vomiting  · Loss of bowel control  · Headache  Home care  Gastroenteritis is transmitted by contact with the stool or vomit of an infected person. This can occur from person to person or from contact with a contaminated surface.  Follow these guidelines when caring for yourself at home:  · If symptoms are severe, rest at home for the next 24 hours or until you are feeling better.  · Wash your hands with soap and water or use alcohol-based  to prevent the spread of infection. Wash your hands after touching anyone who is sick.  · Wash your hands or use alcohol-based   after using the toilet and before meals. Clean the toilet after each use.  Remember these tips when preparing food:  · People with diarrhea should not prepare or serve food to others. When preparing foods, wash your hands before and after.  · Wash your hands after using cutting boards, countertops, knives, or utensils that have been in contact with raw food.  · Keep uncooked meats away from cooked and ready-to-eat foods.  Medicine  You may use acetaminophen or NSAID medicines like ibuprofen or naproxen to control fever unless another medicine was given. If you have chronic liver or kidney disease, talk with your healthcare provider before using these medicines. Also talk with your provider if you've had a stomach ulcer or gastrointestinal bleeding. Don't give aspirin to anyone under 18 years of age who is ill with a fever. It may cause severe liver damage. Don't use NSAIDS is you are already taking one for another condition (like arthritis) or are on aspirin (such as for heart disease or after a stroke).  If medicine for vomiting or diarrhea are prescribed, take these only as directed. Do not take over-the-counter medicines for vomiting or diarrhea unless instructed by your healthcare provider.  Diet  Follow these guidelines for food:  · Water and liquids are important so you don't get dehydrated. Drink a small amount at a time or suck on ice chips if you are vomiting.  · If you eat, avoid fatty, greasy, spicy, or fried foods.  · Don't eat dairy if you have diarrhea. This can make diarrhea worse.  · Avoid tobacco, alcohol, and caffeine which may worsen symptoms.  During the first 24 hours (the first full day), follow the diet below:  · Beverages. Sports drinks, soft drinks without caffeine, ginger ale, mineral water (plain or flavored), decaffeinated tea and coffee. If you are very dehydrated, sports drinks aren't a good choice. They have too much sugar and not enough electrolytes. In this case,  commercially available products called oral rehydration solutions, are best.  · Soups. Eat clear broth, consommé, and bouillon.  · Desserts. Eat gelatin, popsicles, and fruit juice bars.  During the next 24 hours (the second day), you may add the following to the above:  · Hot cereal, plain toast, bread, rolls, and crackers  · Plain noodles, rice, mashed potatoes, chicken noodle or rice soup  · Unsweetened canned fruit (avoid pineapple), bananas  · Limit fat intake to less than 15 grams per day. Do this by avoiding margarine, butter, oils, mayonnaise, sauces, gravies, fried foods, peanut butter, meat, poultry, and fish.  · Limit fiber and avoid raw or cooked vegetables, fresh fruits (except bananas), and bran cereals.  · Limit caffeine and chocolate. Don't use spices or seasonings other than salt.  · Limit dairy products.  · Avoid alcohol.  During the next 24 hours:  · Gradually resume a normal diet as you feel better and your symptoms improve.  · If at any time it starts getting worse again, go back to clear liquids until you feel better.  Follow-up care  Follow up with your healthcare provider, or as advised. Call your provider if you don't get better within 24 hours or if diarrhea lasts more than a week. Also follow up if you are unable to keep down liquids and get dehydrated. If a stool (diarrhea) sample was taken, call as directed for the results.  Call 911  Call 911 if any of these occur:  · Trouble breathing  · Chest pain  · Confused  · Severe drowsiness or trouble awakening  · Fainting or loss of consciousness  · Rapid heart rate  · Seizure  · Stiff neck  When to seek medical advice  Call your healthcare provider right away if any of these occur:  · Abdominal pain that gets worse  · Continued vomiting (unable to keep liquids down)  · Frequent diarrhea (more than 5 times a day)  · Blood in vomit or stool (black or red color)  · Dark urine, reduced urine output, or extreme thirst  · Weakness or  dizziness  · Drowsiness  · Fever of 100.4°F (38°C) oral or higher that does not get better with fever medicine  · New rash  Date Last Reviewed: 1/3/2016  © 0354-7540 [employer]. [address] [place] [address], [place] [address]. All rights reserved. This information is not intended as a substitute for professional medical care. Always follow your healthcare professional's instructions.    Follow up with your doctor in a few days as needed.  Return to the urgent care or go to the ER if symptoms get worse.    Ted Davalos MD

## 2019-12-10 ENCOUNTER — OFFICE VISIT (OUTPATIENT)
Dept: PSYCHIATRY | Facility: CLINIC | Age: 29
End: 2019-12-10
Payer: COMMERCIAL

## 2019-12-10 DIAGNOSIS — F32.A DEPRESSION, UNSPECIFIED DEPRESSION TYPE: Primary | ICD-10-CM

## 2019-12-10 PROCEDURE — 90834 PSYTX W PT 45 MINUTES: CPT | Mod: S$GLB,,, | Performed by: SOCIAL WORKER

## 2019-12-10 PROCEDURE — 90834 PR PSYCHOTHERAPY W/PATIENT, 45 MIN: ICD-10-PCS | Mod: S$GLB,,, | Performed by: SOCIAL WORKER

## 2019-12-10 NOTE — PROGRESS NOTES
Individual Psychotherapy (PhD/LCSW)    12/10/2019    Site:  [facility]         Therapeutic Intervention: Met with patient.  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   This program has a good reputation and facilitates an entrance into food jobs.  His brother finished the program and has a good job now.    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    He denies anger impulsivity problems.     Bored easily and picks nails.  Fidgets.  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    Not hyper as a child.      Younger brother.          He is dating for three years.   Is going well right now.     We did mindfulness of breath and shared the chessboard metaphor.         Treatment plan:  · Target symptoms: depression  · Why chosen therapy is appropriate versus another modality: relevant to diagnosis  · Outcome monitoring methods: self-report, observation  · Therapeutic intervention type: insight oriented psychotherapy, behavior modifying psychotherapy    Risk parameters:  Patient reports no suicidal ideation  Patient reports no homicidal ideation  Patient reports no self-injurious behavior  Patient reports no violent behavior    Verbal deficits: None    Patient's response to intervention:  The patient's response to intervention is accepting.    Progress toward goals and other mental status changes:  The patient's progress toward goals is fair .    Diagnosis:   No diagnosis found.    Plan:  individual psychotherapy    Return to clinic: as scheduled    Length of Service (minutes): 45

## 2020-02-09 ENCOUNTER — PATIENT MESSAGE (OUTPATIENT)
Dept: PSYCHIATRY | Facility: CLINIC | Age: 30
End: 2020-02-09

## 2020-02-14 ENCOUNTER — OFFICE VISIT (OUTPATIENT)
Dept: PSYCHIATRY | Facility: CLINIC | Age: 30
End: 2020-02-14
Payer: COMMERCIAL

## 2020-02-14 DIAGNOSIS — F90.0 ATTENTION DEFICIT HYPERACTIVITY DISORDER (ADHD), PREDOMINANTLY INATTENTIVE TYPE: Primary | ICD-10-CM

## 2020-02-14 PROCEDURE — 90834 PR PSYCHOTHERAPY W/PATIENT, 45 MIN: ICD-10-PCS | Mod: S$GLB,,, | Performed by: SOCIAL WORKER

## 2020-02-14 PROCEDURE — 90834 PSYTX W PT 45 MINUTES: CPT | Mod: S$GLB,,, | Performed by: SOCIAL WORKER

## 2020-02-14 NOTE — Clinical Note
He appears to have adhd on the fairly severe range which is affecting his school work significantly.   He was instructed to set a follow up with you.   Thanks.  LG

## 2020-02-14 NOTE — PROGRESS NOTES
Individual Psychotherapy (PhD/LCSW)    2/14/2020    Site:  [facility]         Therapeutic Intervention: Met with patient.  Outpatient - Insight oriented psychotherapy 45 min - CPT code 71301    Chief complaint/reason for encounter: depression     Interval history and content of current session:   Coding boot camp.    Attention is interfering.      Goal:   Wants to focus for 30 minutes for school work but also overall functioning as his concentration deficits impaired his work, daily life, and school work.    Can not study longer than even 10 minutes.     Easily distracted.    Picks at fingers.    Off topic often.   Daydream.   Reading gets off into dreaming and does not know where he stopped reading.    Forgot computer at home when arrived to work.   He can not locate his computer  either since Monday.      It was recommended he makes an appointment with Dr. Ivan for further consideration/consultation regarding adhd.     Letter given to him at his request which he will use to facilitate accommodations at school per his adhd.            Treatment plan:  · Target symptoms: adhd   · Why chosen therapy is appropriate versus another modality: relevant to diagnosis, evidence based practice  · Outcome monitoring methods: self-report, observation  · Therapeutic intervention type: insight oriented psychotherapy, behavior modifying psychotherapy, supportive psychotherapy    Risk parameters:  Patient reports no suicidal ideation  Patient reports no homicidal ideation  Patient reports no self-injurious behavior  Patient reports no violent behavior    Verbal deficits: None    Patient's response to intervention:  The patient's response to intervention is accepting.    Progress toward goals and other mental status changes:  The patient's progress toward goals is limited.    Diagnosis:     ICD-10-CM ICD-9-CM   1. Attention deficit hyperactivity disorder (ADHD), predominantly inattentive type F90.0 314.00        Plan:  consult psychiatrist for medication evaluation    Return to clinic: as scheduled    Length of Service (minutes): 45

## 2020-02-27 ENCOUNTER — OFFICE VISIT (OUTPATIENT)
Dept: PSYCHIATRY | Facility: CLINIC | Age: 30
End: 2020-02-27
Payer: COMMERCIAL

## 2020-02-27 VITALS
HEART RATE: 66 BPM | DIASTOLIC BLOOD PRESSURE: 82 MMHG | HEIGHT: 70 IN | WEIGHT: 165.81 LBS | SYSTOLIC BLOOD PRESSURE: 136 MMHG | BODY MASS INDEX: 23.74 KG/M2

## 2020-02-27 DIAGNOSIS — F33.1 MDD (MAJOR DEPRESSIVE DISORDER), RECURRENT EPISODE, MODERATE: Primary | ICD-10-CM

## 2020-02-27 DIAGNOSIS — F90.0 ATTENTION DEFICIT HYPERACTIVITY DISORDER (ADHD), PREDOMINANTLY INATTENTIVE TYPE: ICD-10-CM

## 2020-02-27 PROCEDURE — 99999 PR PBB SHADOW E&M-EST. PATIENT-LVL II: ICD-10-PCS | Mod: PBBFAC,,, | Performed by: NURSE PRACTITIONER

## 2020-02-27 PROCEDURE — 99213 OFFICE O/P EST LOW 20 MIN: CPT | Mod: S$GLB,,, | Performed by: NURSE PRACTITIONER

## 2020-02-27 PROCEDURE — 3008F PR BODY MASS INDEX (BMI) DOCUMENTED: ICD-10-PCS | Mod: CPTII,S$GLB,, | Performed by: NURSE PRACTITIONER

## 2020-02-27 PROCEDURE — 99999 PR PBB SHADOW E&M-EST. PATIENT-LVL II: CPT | Mod: PBBFAC,,, | Performed by: NURSE PRACTITIONER

## 2020-02-27 PROCEDURE — 3008F BODY MASS INDEX DOCD: CPT | Mod: CPTII,S$GLB,, | Performed by: NURSE PRACTITIONER

## 2020-02-27 PROCEDURE — 99213 PR OFFICE/OUTPT VISIT, EST, LEVL III, 20-29 MIN: ICD-10-PCS | Mod: S$GLB,,, | Performed by: NURSE PRACTITIONER

## 2020-02-27 PROCEDURE — 90833 PSYTX W PT W E/M 30 MIN: CPT | Mod: S$GLB,,, | Performed by: NURSE PRACTITIONER

## 2020-02-27 PROCEDURE — 90833 PR PSYCHOTHERAPY W/PATIENT W/E&M, 30 MIN (ADD ON): ICD-10-PCS | Mod: S$GLB,,, | Performed by: NURSE PRACTITIONER

## 2020-02-27 RX ORDER — BUPROPION HYDROCHLORIDE 150 MG/1
150 TABLET ORAL DAILY
Qty: 30 TABLET | Refills: 1 | Status: SHIPPED | OUTPATIENT
Start: 2020-02-27 | End: 2020-03-23
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
## 2020-03-22 DIAGNOSIS — F33.1 MDD (MAJOR DEPRESSIVE DISORDER), RECURRENT EPISODE, MODERATE: ICD-10-CM

## 2020-03-22 DIAGNOSIS — F90.0 ATTENTION DEFICIT HYPERACTIVITY DISORDER (ADHD), PREDOMINANTLY INATTENTIVE TYPE: ICD-10-CM

## 2020-03-23 RX ORDER — BUPROPION HYDROCHLORIDE 150 MG/1
TABLET ORAL
Qty: 30 TABLET | Refills: 1 | Status: SHIPPED | OUTPATIENT
Start: 2020-03-23 | End: 2020-04-24

## 2020-04-24 DIAGNOSIS — F33.1 MDD (MAJOR DEPRESSIVE DISORDER), RECURRENT EPISODE, MODERATE: ICD-10-CM

## 2020-04-24 DIAGNOSIS — F90.0 ATTENTION DEFICIT HYPERACTIVITY DISORDER (ADHD), PREDOMINANTLY INATTENTIVE TYPE: ICD-10-CM

## 2020-04-24 RX ORDER — BUPROPION HYDROCHLORIDE 150 MG/1
TABLET ORAL
Qty: 30 TABLET | Refills: 1 | Status: SHIPPED | OUTPATIENT
Start: 2020-04-24 | End: 2020-06-01

## 2020-05-30 DIAGNOSIS — F90.0 ATTENTION DEFICIT HYPERACTIVITY DISORDER (ADHD), PREDOMINANTLY INATTENTIVE TYPE: ICD-10-CM

## 2020-05-30 DIAGNOSIS — F33.1 MDD (MAJOR DEPRESSIVE DISORDER), RECURRENT EPISODE, MODERATE: ICD-10-CM

## 2020-06-01 RX ORDER — BUPROPION HYDROCHLORIDE 150 MG/1
TABLET ORAL
Qty: 30 TABLET | Refills: 1 | Status: SHIPPED | OUTPATIENT
Start: 2020-06-01

## 2020-08-24 ENCOUNTER — OFFICE VISIT (OUTPATIENT)
Dept: URGENT CARE | Facility: CLINIC | Age: 30
End: 2020-08-24
Payer: COMMERCIAL

## 2020-08-24 VITALS
DIASTOLIC BLOOD PRESSURE: 81 MMHG | BODY MASS INDEX: 22.9 KG/M2 | OXYGEN SATURATION: 97 % | TEMPERATURE: 98 F | RESPIRATION RATE: 19 BRPM | HEART RATE: 102 BPM | WEIGHT: 160 LBS | SYSTOLIC BLOOD PRESSURE: 131 MMHG | HEIGHT: 70 IN

## 2020-08-24 DIAGNOSIS — H92.02 OTALGIA OF LEFT EAR: Primary | ICD-10-CM

## 2020-08-24 PROCEDURE — 99213 OFFICE O/P EST LOW 20 MIN: CPT | Mod: S$GLB,,, | Performed by: STUDENT IN AN ORGANIZED HEALTH CARE EDUCATION/TRAINING PROGRAM

## 2020-08-24 PROCEDURE — 99213 PR OFFICE/OUTPT VISIT, EST, LEVL III, 20-29 MIN: ICD-10-PCS | Mod: S$GLB,,, | Performed by: STUDENT IN AN ORGANIZED HEALTH CARE EDUCATION/TRAINING PROGRAM

## 2020-08-24 NOTE — PROGRESS NOTES
"Subjective:       Patient ID: Maxx Neff is a 29 y.o. male.    Vitals:  height is 5' 10" (1.778 m) and weight is 72.6 kg (160 lb). His temperature is 98.4 °F (36.9 °C). His blood pressure is 131/81 and his pulse is 102. His respiration is 19 and oxygen saturation is 97%.     Chief Complaint: Otalgia (left )    Otalgia   There is pain in the left ear. This is a new problem. The current episode started today. The problem occurs constantly. The problem has been unchanged. There has been no fever. The pain is at a severity of 0/10. The patient is experiencing no pain. Pertinent negatives include no abdominal pain, coughing, diarrhea, ear discharge, headaches, hearing loss, neck pain, rash, rhinorrhea, sore throat or vomiting. Treatments tried: laying down to see if it would drain  The treatment provided no relief. There is no history of a chronic ear infection, hearing loss or a tympanostomy tube.       Constitution: Negative for chills, sweating, fatigue and fever.   HENT: Positive for ear pain. Negative for ear discharge, hearing loss, congestion, sinus pain, sinus pressure, sore throat and voice change.    Neck: Negative for neck pain and painful lymph nodes.   Eyes: Negative for eye redness.   Respiratory: Negative for chest tightness, cough, sputum production, bloody sputum, COPD, shortness of breath, stridor, wheezing and asthma.    Gastrointestinal: Negative for abdominal pain, nausea, vomiting and diarrhea.   Musculoskeletal: Negative for muscle ache.   Skin: Negative for rash.   Allergic/Immunologic: Negative for seasonal allergies and asthma.   Neurological: Negative for headaches.   Hematologic/Lymphatic: Negative for swollen lymph nodes.       Objective:      Physical Exam   Constitutional: He does not appear ill. No distress.   HENT:   Ears:   Right Ear: Tympanic membrane and ear canal normal.      Comments: Excessive cerumen in left ear without impaction. Hearing normal  Eyes: Conjunctivae are normal. "   Abdominal: Normal appearance.   Neurological: He is alert.   Skin: Skin is warm and dry. Psychiatric: His behavior is normal.   Nursing note and vitals reviewed.        Assessment:       1. Otalgia of left ear        Plan:         Otalgia of left ear  -     Ear wax removal         Repeat ear exam after lavage without evidence of infection or effusion. Ear care discussed with patient. Diagnosis and plan discussed with the patient as well as the expected course and duration of his  symptoms. All questions and concerns were addressed prior to discharge. He was advised to follow up with his PCP as needed. Use prescription and/or OTC medications as directed. Patient verbalized understanding and was happy with the plan of care.

## 2020-08-24 NOTE — PATIENT INSTRUCTIONS
Earwax (Treated)    Everyone produces earwax from the lining of the ear canal. It lubricates and protects the ear. The wax that forms in the canal slowly moves toward the outside of the ear and falls out. Sometimes wax can build up in the ear canal. This can cause a blockage and loss of hearing. A buildup of earwax was removed from your ear today.  Home care  If you have a tendency to build up wax in the ear canal, you should clear the wax at home regularly, before it causes discomfort. This should be about once every six months.  · Unless a medicine was prescribed, you may use an over-the-counter product made for clearing earwax. These contain carbamide peroxide and are available over-the-counter in a kit with a small bulb syringe.  · Lie down with the blocked ear facing upward. Apply one dropper full of medicine and wait a few minutes. Grasp the outer ear and wiggle it to help the solution enter the canal.  · Lean over a sink or basin with the blocked ear turned downward. Use a rubber bulb syringe filled with warm (not hot or cold) water to rinse the ear several times. Use gentle pressure only. You may need to repeat the irrigation several times before the wax flows out.  · If you are having trouble draining all the water out of your ear canal, put a few drops of rubbing alcohol into the ear canal. This will help remove the remaining water.  Don'ts  · Dont use cold water to rinse the ear. This will make you dizzy.  · Dont do this procedure if you have an ear infection. Symptoms include ear pain, fever, or fluid draining from the ear.  · Dont do this procedure if you have a punctured eardrum.  · Dont use cotton swabs, matches, hairpins, keys, or other objects to clean the ear canal. This can cause infection of the ear canal or rupture of the eardrum. Because of their size and shape, cotton swabs can push the earwax deeper into the ear canal instead of removing it.  Follow-up care  Follow up with your  healthcare provider, or as advised.  When to seek medical advice  Call your healthcare provider right away if any of these occur:  · Worsening ear pain  · Fever of 100.4°F (38°C) or higher, or as directed by your healthcare provider  · Hearing does not return to normal after three days of treatment  · Fluid drainage or bleeding from the ear canal  · Swelling, redness, or tenderness of the outer ear  · Headache, neck pain, or stiff neck  Date Last Reviewed: 3/22/2015  © 9449-5932 [employer]. [address] [place] [id number]. All rights reserved. This information is not intended as a substitute for professional medical care. Always follow your healthcare professional's instructions.

## 2020-12-04 ENCOUNTER — OFFICE VISIT (OUTPATIENT)
Dept: DERMATOLOGY | Facility: CLINIC | Age: 30
End: 2020-12-04
Payer: COMMERCIAL

## 2020-12-04 ENCOUNTER — LAB VISIT (OUTPATIENT)
Dept: LAB | Facility: HOSPITAL | Age: 30
End: 2020-12-04
Attending: DERMATOLOGY
Payer: COMMERCIAL

## 2020-12-04 DIAGNOSIS — L70.9 ACNE, UNSPECIFIED ACNE TYPE: ICD-10-CM

## 2020-12-04 DIAGNOSIS — L70.0 CYSTIC ACNE: ICD-10-CM

## 2020-12-04 DIAGNOSIS — L66.3 DISSECTING CELLULITIS OF SCALP: ICD-10-CM

## 2020-12-04 DIAGNOSIS — L70.0 CYSTIC ACNE: Primary | ICD-10-CM

## 2020-12-04 LAB
ALBUMIN SERPL BCP-MCNC: 4 G/DL (ref 3.5–5.2)
ALP SERPL-CCNC: 136 U/L (ref 55–135)
ALT SERPL W/O P-5'-P-CCNC: 55 U/L (ref 10–44)
AST SERPL-CCNC: 30 U/L (ref 10–40)
BASOPHILS # BLD AUTO: 0.04 K/UL (ref 0–0.2)
BASOPHILS NFR BLD: 0.3 % (ref 0–1.9)
BILIRUB DIRECT SERPL-MCNC: 0.4 MG/DL (ref 0.1–0.3)
BILIRUB SERPL-MCNC: 0.8 MG/DL (ref 0.1–1)
CHOLEST SERPL-MCNC: 126 MG/DL (ref 120–199)
CHOLEST/HDLC SERPL: 3.2 {RATIO} (ref 2–5)
DIFFERENTIAL METHOD: ABNORMAL
EOSINOPHIL # BLD AUTO: 0.1 K/UL (ref 0–0.5)
EOSINOPHIL NFR BLD: 1.1 % (ref 0–8)
ERYTHROCYTE [DISTWIDTH] IN BLOOD BY AUTOMATED COUNT: 15.1 % (ref 11.5–14.5)
HCT VFR BLD AUTO: 44.6 % (ref 40–54)
HDLC SERPL-MCNC: 40 MG/DL (ref 40–75)
HDLC SERPL: 31.7 % (ref 20–50)
HGB BLD-MCNC: 12.9 G/DL (ref 14–18)
IMM GRANULOCYTES # BLD AUTO: 0.03 K/UL (ref 0–0.04)
IMM GRANULOCYTES NFR BLD AUTO: 0.2 % (ref 0–0.5)
LDLC SERPL CALC-MCNC: 76.8 MG/DL (ref 63–159)
LYMPHOCYTES # BLD AUTO: 2.9 K/UL (ref 1–4.8)
LYMPHOCYTES NFR BLD: 22 % (ref 18–48)
MCH RBC QN AUTO: 22.4 PG (ref 27–31)
MCHC RBC AUTO-ENTMCNC: 28.9 G/DL (ref 32–36)
MCV RBC AUTO: 77 FL (ref 82–98)
MONOCYTES # BLD AUTO: 0.8 K/UL (ref 0.3–1)
MONOCYTES NFR BLD: 5.8 % (ref 4–15)
NEUTROPHILS # BLD AUTO: 9.3 K/UL (ref 1.8–7.7)
NEUTROPHILS NFR BLD: 70.6 % (ref 38–73)
NONHDLC SERPL-MCNC: 86 MG/DL
NRBC BLD-RTO: 0 /100 WBC
PLATELET # BLD AUTO: 255 K/UL (ref 150–350)
PMV BLD AUTO: 12.7 FL (ref 9.2–12.9)
PROT SERPL-MCNC: 7.8 G/DL (ref 6–8.4)
RBC # BLD AUTO: 5.76 M/UL (ref 4.6–6.2)
TRIGL SERPL-MCNC: 46 MG/DL (ref 30–150)
WBC # BLD AUTO: 13.13 K/UL (ref 3.9–12.7)

## 2020-12-04 PROCEDURE — 11901 INJECT SKIN LESIONS >7: CPT | Mod: S$GLB,,, | Performed by: DERMATOLOGY

## 2020-12-04 PROCEDURE — 99202 PR OFFICE/OUTPT VISIT, NEW, LEVL II, 15-29 MIN: ICD-10-PCS | Mod: 25,S$GLB,, | Performed by: DERMATOLOGY

## 2020-12-04 PROCEDURE — 80061 LIPID PANEL: CPT

## 2020-12-04 PROCEDURE — 85025 COMPLETE CBC W/AUTO DIFF WBC: CPT

## 2020-12-04 PROCEDURE — 1126F AMNT PAIN NOTED NONE PRSNT: CPT | Mod: S$GLB,,, | Performed by: DERMATOLOGY

## 2020-12-04 PROCEDURE — 99202 OFFICE O/P NEW SF 15 MIN: CPT | Mod: 25,S$GLB,, | Performed by: DERMATOLOGY

## 2020-12-04 PROCEDURE — 80076 HEPATIC FUNCTION PANEL: CPT

## 2020-12-04 PROCEDURE — 1126F PR PAIN SEVERITY QUANTIFIED, NO PAIN PRESENT: ICD-10-PCS | Mod: S$GLB,,, | Performed by: DERMATOLOGY

## 2020-12-04 PROCEDURE — 99999 PR PBB SHADOW E&M-EST. PATIENT-LVL II: ICD-10-PCS | Mod: PBBFAC,,, | Performed by: DERMATOLOGY

## 2020-12-04 PROCEDURE — 99999 PR PBB SHADOW E&M-EST. PATIENT-LVL II: CPT | Mod: PBBFAC,,, | Performed by: DERMATOLOGY

## 2020-12-04 PROCEDURE — 11901 PR INJECTION, SKIN LESIONS, 8 OR MORE: ICD-10-PCS | Mod: S$GLB,,, | Performed by: DERMATOLOGY

## 2020-12-04 PROCEDURE — 36415 COLL VENOUS BLD VENIPUNCTURE: CPT

## 2020-12-04 NOTE — PROGRESS NOTES
Subjective:       Patient ID:  Maxx Neff is a 30 y.o. male who presents for   Chief Complaint   Patient presents with    Acne      x 10 years     Acne - Initial  Affected locations: face, chin and scalp  Duration: 10 years  Signs / symptoms: irritated, tender, redness, inflamed and dryness  Severity: moderate to severe  Timing: constant  Aggravated by: stress (diet high in carbs and dairy)  Relieving factors/Treatments tried: nothing        Review of Systems   HENT: Negative for nosebleeds and headaches.    Gastrointestinal: Negative for diarrhea and Sensitivity to oral antibiotics.   Genitourinary: Negative for irregular periods.   Musculoskeletal: Negative for arthralgias.   Skin: Positive for activity-related sunscreen use and wears hat. Negative for daily sunscreen use and recent sunburn.   Neurological: Negative for headaches.   Psychiatric/Behavioral: Negative for depressed mood.        Objective:    Physical Exam   Constitutional: He appears well-developed and well-nourished.   Neurological: He is alert and oriented to person, place, and time.   Psychiatric: He has a normal mood and affect.   Skin:   Areas Examined (abnormalities noted in diagram):   Head / Face Inspection Performed  Neck Inspection Performed              Diagram Legend     Erythematous scaling macule/papule c/w actinic keratosis       Vascular papule c/w angioma      Pigmented verrucoid papule/plaque c/w seborrheic keratosis      Yellow umbilicated papule c/w sebaceous hyperplasia      Irregularly shaped tan macule c/w lentigo     1-2 mm smooth white papules consistent with Milia      Movable subcutaneous cyst with punctum c/w epidermal inclusion cyst      Subcutaneous movable cyst c/w pilar cyst      Firm pink to brown papule c/w dermatofibroma      Pedunculated fleshy papule(s) c/w skin tag(s)      Evenly pigmented macule c/w junctional nevus     Mildly variegated pigmented, slightly irregular-bordered macule c/w mildly atypical  nevus      Flesh colored to evenly pigmented papule c/w intradermal nevus       Pink pearly papule/plaque c/w basal cell carcinoma      Erythematous hyperkeratotic cursted plaque c/w SCC      Surgical scar with no sign of skin cancer recurrence      Open and closed comedones      Inflammatory papules and pustules      Verrucoid papule consistent consistent with wart     Erythematous eczematous patches and plaques     Dystrophic onycholytic nail with subungual debris c/w onychomycosis     Umbilicated papule    Erythematous-base heme-crusted tan verrucoid plaque consistent with inflamed seborrheic keratosis     Erythematous Silvery Scaling Plaque c/w Psoriasis     See annotation      Assessment / Plan:        Cystic acne  -     Lipid Panel; Future; Expected date: 12/04/2020  -     Hepatic Function Panel; Future; Expected date: 12/04/2020  -     CBC Auto Differential; Future; Expected date: 12/04/2020  Discussed risks and benefits of Isotretinoin including but not limited to dry eyes, dry skin, and dry lips; headaches; nosebleeds; muscle aches; joint aches; elevated liver functions; elevated cholesterol or triglycerides; depression; diarrhea/stomach cramping (inflammatory bowel disease); increased sun sensitivity. No laser while on Isotretinoin or for one month after completion of Isotretinoin course. No waxing and no donating blood while on Isotretinoin or for one month after completion of Isotretinoin course.    Will get consents signed and enter patient into [facility] program.  Will check baseline lipid panel, liver function tests.   If labs normal, will start Isotretinoin at 80 mg po daily.   Brochures reviewed and provided.        Acne, unspecified acne type    Dissecting cellulitis of scalp  Intralesional Kenalog 38mg/cc (1 cc total) injected into 8 lesions on the scalp (vertex/occiptal) today after obtaining verbal consent including risk of surrounding hypopigmentation. Patient tolerated procedure  well.    Units:1  NDC for Kenalog 40mg/cc:  8054-3762-46      Treatment considerations for any stage:   Low glycemic index diet/wt loss/no dairy OR paleo diet  Wear loose fitting clothing  Anti-inflammatory supplements:  Turmeric - start with 500mg every day and increase to 1 g every day (may cause GI upset)- 100x more anti-inflammatory if mixed with black pepper or with fatty food  Zinc (solaray 50mg on amazon)  HIbiclens wash and /or benzoyl peroxide wash to affected areas daily      F/u 1 mo accutane               No follow-ups on file.

## 2020-12-04 NOTE — PATIENT INSTRUCTIONS
Treatment considerations for any stage:   Low glycemic index diet/wt loss/no dairy OR paleo diet  Wear loose fitting clothing  Anti-inflammatory supplements:  Turmeric - start with 500mg every day and increase to 1 g every day (may cause GI upset)- 100x more anti-inflammatory if mixed with black pepper or with fatty food  Zinc (solaray 50mg on amazon)  HIbiclens wash and /or benzoyl peroxide wash to affected areas daily         ACCUTANE COUNSELING      · If you are a female, you are obligated  to use 2 forms of birth control. Do NOT CHANGE birth control methods during your course of treatment without contacting our office first. Do NOT change your form of birth control on the ipledge site.   · You are not allowed to donate blood while taking Accutane and for 1 month after  · If you experience severe continuous headaches with nausea/vomiting and/or light sensitivity please go to the emergency room.  · Elective surgeries including dental procedures are not recommended while on therapy.  · Tetracycline antibiotics (including minocycline and doxycycline) should not be taken while on Accutane.  · It is recommended that you wait to have any laser surgery until 6months after completion of your Accutane course.  · Let any physician participating in your health care know that you are on Accutane.  · Do not share your medicine with anyone  · You may experience increase skin sensitivity if getting any hair bearing areas waxed.  · A low fat, low cholesterol diet and minimal alcohol intake are recommended whole on Accutane.  · You will experience increase sun sensitivity while on Accutane-sun protection including a hat, sunscreen (Elta MD or Neutrogena ultra sheer dry touch [id number], and protective clothing are encouraged.  · There is increase susceptibility to bacterial (such as Staph) and viral infections (such as herpes simplex) while on this medication.  · Do not use any other oral or topical acne medications unless cleared by  your doctor.  · Take the medicine with food for best results.  · Common side effects include:  · Dry lips- do not use medicated lip balms such as blistex or carmex. Vaseline petroleum is best  · Dry skin-Luke warm baths, mild soap such as Cetaphil, moisturizing creams such as Cerave, Aveeno or Cetaphil                                                                          If you have any questions or concerns, please contact your healthcare provider    [address] [place] [id number]/ [phone] FAX/ [email or web address]

## 2020-12-07 DIAGNOSIS — L70.0 CYSTIC ACNE: Primary | ICD-10-CM

## 2020-12-07 RX ORDER — ISOTRETINOIN 40 MG/1
CAPSULE ORAL
Qty: 60 CAPSULE | Refills: 0 | Status: SHIPPED | OUTPATIENT
Start: 2020-12-07 | End: 2021-01-04 | Stop reason: SDUPTHER

## 2020-12-07 NOTE — PROGRESS NOTES
You liver function and lipid panel is within an acceptable range. I have called in accutane for you. It will be sent to [facility] mail [facility]. They will mail it  To you. You are slightly anemic, I  Suggest to seek out care from a primary care physician to address this. I can put in a referral if you would like.  Thanks,  Dr. Anguiano

## 2021-01-04 ENCOUNTER — OFFICE VISIT (OUTPATIENT)
Dept: DERMATOLOGY | Facility: CLINIC | Age: 31
End: 2021-01-04
Payer: COMMERCIAL

## 2021-01-04 DIAGNOSIS — L66.3 DISSECTING CELLULITIS OF SCALP: ICD-10-CM

## 2021-01-04 DIAGNOSIS — L70.0 CYSTIC ACNE: Primary | ICD-10-CM

## 2021-01-04 DIAGNOSIS — Z79.899 ENCOUNTER FOR LONG-TERM (CURRENT) USE OF OTHER MEDICATIONS: ICD-10-CM

## 2021-01-04 PROCEDURE — 1126F PR PAIN SEVERITY QUANTIFIED, NO PAIN PRESENT: ICD-10-PCS | Mod: S$GLB,,, | Performed by: DERMATOLOGY

## 2021-01-04 PROCEDURE — 99999 PR PBB SHADOW E&M-EST. PATIENT-LVL II: CPT | Mod: PBBFAC,,, | Performed by: DERMATOLOGY

## 2021-01-04 PROCEDURE — 99214 OFFICE O/P EST MOD 30 MIN: CPT | Mod: S$GLB,,, | Performed by: DERMATOLOGY

## 2021-01-04 PROCEDURE — 99999 PR PBB SHADOW E&M-EST. PATIENT-LVL II: ICD-10-PCS | Mod: PBBFAC,,, | Performed by: DERMATOLOGY

## 2021-01-04 PROCEDURE — 99214 PR OFFICE/OUTPT VISIT, EST, LEVL IV, 30-39 MIN: ICD-10-PCS | Mod: S$GLB,,, | Performed by: DERMATOLOGY

## 2021-01-04 PROCEDURE — 1126F AMNT PAIN NOTED NONE PRSNT: CPT | Mod: S$GLB,,, | Performed by: DERMATOLOGY

## 2021-01-04 RX ORDER — ISOTRETINOIN 40 MG/1
CAPSULE ORAL
Qty: 60 CAPSULE | Refills: 0 | Status: SHIPPED | OUTPATIENT
Start: 2021-01-04 | End: 2021-02-23 | Stop reason: SDUPTHER

## 2021-01-05 ENCOUNTER — TELEPHONE (OUTPATIENT)
Dept: DERMATOLOGY | Facility: CLINIC | Age: 31
End: 2021-01-05

## 2021-01-05 ENCOUNTER — PATIENT MESSAGE (OUTPATIENT)
Dept: DERMATOLOGY | Facility: CLINIC | Age: 31
End: 2021-01-05

## 2021-02-07 DIAGNOSIS — L70.0 CYSTIC ACNE: ICD-10-CM

## 2021-02-08 RX ORDER — ISOTRETINOIN 40 MG/1
CAPSULE ORAL
Qty: 60 CAPSULE | Refills: 0 | OUTPATIENT
Start: 2021-02-08

## 2021-02-09 ENCOUNTER — PATIENT MESSAGE (OUTPATIENT)
Dept: DERMATOLOGY | Facility: CLINIC | Age: 31
End: 2021-02-09

## 2021-02-23 ENCOUNTER — LAB VISIT (OUTPATIENT)
Dept: LAB | Facility: HOSPITAL | Age: 31
End: 2021-02-23
Payer: COMMERCIAL

## 2021-02-23 ENCOUNTER — OFFICE VISIT (OUTPATIENT)
Dept: DERMATOLOGY | Facility: CLINIC | Age: 31
End: 2021-02-23
Payer: COMMERCIAL

## 2021-02-23 DIAGNOSIS — Z79.899 ENCOUNTER FOR LONG-TERM (CURRENT) USE OF OTHER MEDICATIONS: ICD-10-CM

## 2021-02-23 DIAGNOSIS — L02.91 ABSCESS: Primary | ICD-10-CM

## 2021-02-23 DIAGNOSIS — L66.3 DISSECTING CELLULITIS OF SCALP: ICD-10-CM

## 2021-02-23 DIAGNOSIS — L70.0 CYSTIC ACNE: ICD-10-CM

## 2021-02-23 LAB
ALBUMIN SERPL BCP-MCNC: 3.9 G/DL (ref 3.5–5.2)
ALP SERPL-CCNC: 100 U/L (ref 55–135)
ALT SERPL W/O P-5'-P-CCNC: 19 U/L (ref 10–44)
AST SERPL-CCNC: 15 U/L (ref 10–40)
BILIRUB DIRECT SERPL-MCNC: 0.3 MG/DL (ref 0.1–0.3)
BILIRUB SERPL-MCNC: 0.8 MG/DL (ref 0.1–1)
CHOLEST SERPL-MCNC: 128 MG/DL (ref 120–199)
CHOLEST/HDLC SERPL: 3.7 {RATIO} (ref 2–5)
HDLC SERPL-MCNC: 35 MG/DL (ref 40–75)
HDLC SERPL: 27.3 % (ref 20–50)
LDLC SERPL CALC-MCNC: 82.8 MG/DL (ref 63–159)
NONHDLC SERPL-MCNC: 93 MG/DL
PROT SERPL-MCNC: 8 G/DL (ref 6–8.4)
TRIGL SERPL-MCNC: 51 MG/DL (ref 30–150)

## 2021-02-23 PROCEDURE — 99999 PR PBB SHADOW E&M-EST. PATIENT-LVL II: ICD-10-PCS | Mod: PBBFAC,,, | Performed by: PHYSICIAN ASSISTANT

## 2021-02-23 PROCEDURE — 99214 OFFICE O/P EST MOD 30 MIN: CPT | Mod: 25,S$GLB,, | Performed by: PHYSICIAN ASSISTANT

## 2021-02-23 PROCEDURE — 80061 LIPID PANEL: CPT

## 2021-02-23 PROCEDURE — 36415 COLL VENOUS BLD VENIPUNCTURE: CPT

## 2021-02-23 PROCEDURE — 10060 I&D ABSCESS SIMPLE/SINGLE: CPT | Mod: S$GLB,,, | Performed by: PHYSICIAN ASSISTANT

## 2021-02-23 PROCEDURE — 10060 PR DRAIN SKIN ABSCESS SIMPLE: ICD-10-PCS | Mod: S$GLB,,, | Performed by: PHYSICIAN ASSISTANT

## 2021-02-23 PROCEDURE — 1126F AMNT PAIN NOTED NONE PRSNT: CPT | Mod: S$GLB,,, | Performed by: PHYSICIAN ASSISTANT

## 2021-02-23 PROCEDURE — 99999 PR PBB SHADOW E&M-EST. PATIENT-LVL II: CPT | Mod: PBBFAC,,, | Performed by: PHYSICIAN ASSISTANT

## 2021-02-23 PROCEDURE — 87070 CULTURE OTHR SPECIMN AEROBIC: CPT

## 2021-02-23 PROCEDURE — 99214 PR OFFICE/OUTPT VISIT, EST, LEVL IV, 30-39 MIN: ICD-10-PCS | Mod: 25,S$GLB,, | Performed by: PHYSICIAN ASSISTANT

## 2021-02-23 PROCEDURE — 80076 HEPATIC FUNCTION PANEL: CPT

## 2021-02-23 PROCEDURE — 1126F PR PAIN SEVERITY QUANTIFIED, NO PAIN PRESENT: ICD-10-PCS | Mod: S$GLB,,, | Performed by: PHYSICIAN ASSISTANT

## 2021-02-23 RX ORDER — ISOTRETINOIN 40 MG/1
CAPSULE ORAL
Qty: 60 CAPSULE | Refills: 0 | Status: SHIPPED | OUTPATIENT
Start: 2021-02-23 | End: 2021-03-24 | Stop reason: SDUPTHER

## 2021-02-24 ENCOUNTER — PATIENT MESSAGE (OUTPATIENT)
Dept: DERMATOLOGY | Facility: CLINIC | Age: 31
End: 2021-02-24

## 2021-02-26 LAB — BACTERIA SPEC AEROBE CULT: NORMAL

## 2021-03-03 ENCOUNTER — PATIENT MESSAGE (OUTPATIENT)
Dept: DERMATOLOGY | Facility: CLINIC | Age: 31
End: 2021-03-03

## 2021-03-10 ENCOUNTER — OFFICE VISIT (OUTPATIENT)
Dept: DERMATOLOGY | Facility: CLINIC | Age: 31
End: 2021-03-10
Payer: COMMERCIAL

## 2021-03-10 DIAGNOSIS — L03.115 CELLULITIS OF RIGHT LOWER EXTREMITY: ICD-10-CM

## 2021-03-10 DIAGNOSIS — L73.2 HIDRADENITIS SUPPURATIVA: Primary | ICD-10-CM

## 2021-03-10 PROCEDURE — 11042 DBRDMT SUBQ TIS 1ST 20SQCM/<: CPT | Mod: S$GLB,,, | Performed by: DERMATOLOGY

## 2021-03-10 PROCEDURE — 11042 PR DEBRIDEMENT, SKIN, SUB-Q TISSUE,=<20 SQ CM: ICD-10-PCS | Mod: S$GLB,,, | Performed by: DERMATOLOGY

## 2021-03-10 PROCEDURE — 99999 PR PBB SHADOW E&M-EST. PATIENT-LVL II: CPT | Mod: PBBFAC,,, | Performed by: DERMATOLOGY

## 2021-03-10 PROCEDURE — 1126F PR PAIN SEVERITY QUANTIFIED, NO PAIN PRESENT: ICD-10-PCS | Mod: S$GLB,,, | Performed by: DERMATOLOGY

## 2021-03-10 PROCEDURE — 1126F AMNT PAIN NOTED NONE PRSNT: CPT | Mod: S$GLB,,, | Performed by: DERMATOLOGY

## 2021-03-10 PROCEDURE — 99214 OFFICE O/P EST MOD 30 MIN: CPT | Mod: 25,S$GLB,, | Performed by: DERMATOLOGY

## 2021-03-10 PROCEDURE — 99214 PR OFFICE/OUTPT VISIT, EST, LEVL IV, 30-39 MIN: ICD-10-PCS | Mod: 25,S$GLB,, | Performed by: DERMATOLOGY

## 2021-03-10 PROCEDURE — 99999 PR PBB SHADOW E&M-EST. PATIENT-LVL II: ICD-10-PCS | Mod: PBBFAC,,, | Performed by: DERMATOLOGY

## 2021-03-10 RX ORDER — CLINDAMYCIN HYDROCHLORIDE 300 MG/1
300 CAPSULE ORAL EVERY 8 HOURS
Qty: 30 CAPSULE | Refills: 0 | Status: SHIPPED | OUTPATIENT
Start: 2021-03-10 | End: 2021-03-20

## 2021-03-24 ENCOUNTER — OFFICE VISIT (OUTPATIENT)
Dept: DERMATOLOGY | Facility: CLINIC | Age: 31
End: 2021-03-24
Payer: COMMERCIAL

## 2021-03-24 DIAGNOSIS — L03.115 CELLULITIS OF RIGHT LOWER EXTREMITY: ICD-10-CM

## 2021-03-24 DIAGNOSIS — Z79.899 ENCOUNTER FOR LONG-TERM (CURRENT) USE OF OTHER MEDICATIONS: ICD-10-CM

## 2021-03-24 DIAGNOSIS — H53.9 VISION CHANGES: ICD-10-CM

## 2021-03-24 DIAGNOSIS — R53.83 OTHER FATIGUE: ICD-10-CM

## 2021-03-24 DIAGNOSIS — L70.0 ACNE VULGARIS: Primary | ICD-10-CM

## 2021-03-24 DIAGNOSIS — M25.551 PAIN OF BOTH HIP JOINTS: ICD-10-CM

## 2021-03-24 DIAGNOSIS — L66.3 DISSECTING CELLULITIS OF SCALP: ICD-10-CM

## 2021-03-24 DIAGNOSIS — L70.0 CYSTIC ACNE: ICD-10-CM

## 2021-03-24 DIAGNOSIS — M25.552 PAIN OF BOTH HIP JOINTS: ICD-10-CM

## 2021-03-24 PROCEDURE — 99214 PR OFFICE/OUTPT VISIT, EST, LEVL IV, 30-39 MIN: ICD-10-PCS | Mod: S$GLB,,, | Performed by: DERMATOLOGY

## 2021-03-24 PROCEDURE — 1126F AMNT PAIN NOTED NONE PRSNT: CPT | Mod: S$GLB,,, | Performed by: DERMATOLOGY

## 2021-03-24 PROCEDURE — 99214 OFFICE O/P EST MOD 30 MIN: CPT | Mod: S$GLB,,, | Performed by: DERMATOLOGY

## 2021-03-24 PROCEDURE — 1126F PR PAIN SEVERITY QUANTIFIED, NO PAIN PRESENT: ICD-10-PCS | Mod: S$GLB,,, | Performed by: DERMATOLOGY

## 2021-03-24 PROCEDURE — 99999 PR PBB SHADOW E&M-EST. PATIENT-LVL II: ICD-10-PCS | Mod: PBBFAC,,, | Performed by: DERMATOLOGY

## 2021-03-24 PROCEDURE — 99999 PR PBB SHADOW E&M-EST. PATIENT-LVL II: CPT | Mod: PBBFAC,,, | Performed by: DERMATOLOGY

## 2021-03-24 RX ORDER — RIFAMPIN 300 MG/1
300 CAPSULE ORAL EVERY 12 HOURS
Qty: 60 CAPSULE | Refills: 0 | Status: SHIPPED | OUTPATIENT
Start: 2021-03-24 | End: 2021-04-23

## 2021-03-24 RX ORDER — ISOTRETINOIN 40 MG/1
CAPSULE ORAL
Qty: 60 CAPSULE | Refills: 0 | Status: SHIPPED | OUTPATIENT
Start: 2021-03-24 | End: 2021-04-09 | Stop reason: SDUPTHER

## 2021-03-24 RX ORDER — CLINDAMYCIN HYDROCHLORIDE 300 MG/1
300 CAPSULE ORAL EVERY 8 HOURS
Qty: 90 CAPSULE | Refills: 0 | Status: SHIPPED | OUTPATIENT
Start: 2021-03-24 | End: 2021-04-23

## 2021-03-26 ENCOUNTER — IMMUNIZATION (OUTPATIENT)
Dept: PRIMARY CARE CLINIC | Facility: CLINIC | Age: 31
End: 2021-03-26
Payer: COMMERCIAL

## 2021-03-26 DIAGNOSIS — Z23 NEED FOR VACCINATION: Primary | ICD-10-CM

## 2021-03-26 PROCEDURE — 0001A PR IMMUNIZ ADMIN, SARS-COV-2 COVID-19 VACC, 30MCG/0.3ML, 1ST DOSE: ICD-10-PCS | Mod: CV19,S$GLB,, | Performed by: INTERNAL MEDICINE

## 2021-03-26 PROCEDURE — 91300 PR SARS-COV- 2 COVID-19 VACCINE, NO PRSV, 30MCG/0.3ML, IM: ICD-10-PCS | Mod: S$GLB,,, | Performed by: INTERNAL MEDICINE

## 2021-03-26 PROCEDURE — 0001A PR IMMUNIZ ADMIN, SARS-COV-2 COVID-19 VACC, 30MCG/0.3ML, 1ST DOSE: CPT | Mod: CV19,S$GLB,, | Performed by: INTERNAL MEDICINE

## 2021-03-26 PROCEDURE — 91300 PR SARS-COV- 2 COVID-19 VACCINE, NO PRSV, 30MCG/0.3ML, IM: CPT | Mod: S$GLB,,, | Performed by: INTERNAL MEDICINE

## 2021-03-26 RX ADMIN — Medication 0.3 ML: at 04:03

## 2021-04-09 ENCOUNTER — PATIENT MESSAGE (OUTPATIENT)
Dept: DERMATOLOGY | Facility: CLINIC | Age: 31
End: 2021-04-09

## 2021-04-09 DIAGNOSIS — L70.0 CYSTIC ACNE: ICD-10-CM

## 2021-04-09 RX ORDER — ISOTRETINOIN 40 MG/1
CAPSULE ORAL
Qty: 60 CAPSULE | Refills: 0 | Status: SHIPPED | OUTPATIENT
Start: 2021-04-09 | End: 2021-05-26 | Stop reason: SDUPTHER

## 2021-04-18 ENCOUNTER — IMMUNIZATION (OUTPATIENT)
Dept: PRIMARY CARE CLINIC | Facility: CLINIC | Age: 31
End: 2021-04-18
Payer: COMMERCIAL

## 2021-04-18 DIAGNOSIS — Z23 NEED FOR VACCINATION: Primary | ICD-10-CM

## 2021-04-18 PROCEDURE — 0002A PR IMMUNIZ ADMIN, SARS-COV-2 COVID-19 VACC, 30MCG/0.3ML, 2ND DOSE: ICD-10-PCS | Mod: CV19,S$GLB,, | Performed by: INTERNAL MEDICINE

## 2021-04-18 PROCEDURE — 91300 PR SARS-COV- 2 COVID-19 VACCINE, NO PRSV, 30MCG/0.3ML, IM: CPT | Mod: S$GLB,,, | Performed by: INTERNAL MEDICINE

## 2021-04-18 PROCEDURE — 0002A PR IMMUNIZ ADMIN, SARS-COV-2 COVID-19 VACC, 30MCG/0.3ML, 2ND DOSE: CPT | Mod: CV19,S$GLB,, | Performed by: INTERNAL MEDICINE

## 2021-04-18 PROCEDURE — 91300 PR SARS-COV- 2 COVID-19 VACCINE, NO PRSV, 30MCG/0.3ML, IM: ICD-10-PCS | Mod: S$GLB,,, | Performed by: INTERNAL MEDICINE

## 2021-04-18 RX ADMIN — Medication 0.3 ML: at 07:04

## 2021-05-26 ENCOUNTER — OFFICE VISIT (OUTPATIENT)
Dept: DERMATOLOGY | Facility: CLINIC | Age: 31
End: 2021-05-26
Payer: COMMERCIAL

## 2021-05-26 DIAGNOSIS — L73.2 HIDRADENITIS SUPPURATIVA: ICD-10-CM

## 2021-05-26 DIAGNOSIS — R53.83 OTHER FATIGUE: ICD-10-CM

## 2021-05-26 DIAGNOSIS — L70.0 CYSTIC ACNE: ICD-10-CM

## 2021-05-26 DIAGNOSIS — L85.3 DRY SKIN: ICD-10-CM

## 2021-05-26 DIAGNOSIS — L66.3 DISSECTING CELLULITIS OF SCALP: ICD-10-CM

## 2021-05-26 DIAGNOSIS — L70.0 ACNE VULGARIS: ICD-10-CM

## 2021-05-26 DIAGNOSIS — H53.9 VISION CHANGES: Primary | ICD-10-CM

## 2021-05-26 PROCEDURE — 99214 PR OFFICE/OUTPT VISIT, EST, LEVL IV, 30-39 MIN: ICD-10-PCS | Mod: S$GLB,,, | Performed by: DERMATOLOGY

## 2021-05-26 PROCEDURE — 1125F AMNT PAIN NOTED PAIN PRSNT: CPT | Mod: S$GLB,,, | Performed by: DERMATOLOGY

## 2021-05-26 PROCEDURE — 99999 PR PBB SHADOW E&M-EST. PATIENT-LVL II: CPT | Mod: PBBFAC,,, | Performed by: DERMATOLOGY

## 2021-05-26 PROCEDURE — 99999 PR PBB SHADOW E&M-EST. PATIENT-LVL II: ICD-10-PCS | Mod: PBBFAC,,, | Performed by: DERMATOLOGY

## 2021-05-26 PROCEDURE — 1125F PR PAIN SEVERITY QUANTIFIED, PAIN PRESENT: ICD-10-PCS | Mod: S$GLB,,, | Performed by: DERMATOLOGY

## 2021-05-26 PROCEDURE — 99214 OFFICE O/P EST MOD 30 MIN: CPT | Mod: S$GLB,,, | Performed by: DERMATOLOGY

## 2021-05-26 RX ORDER — ISOTRETINOIN 40 MG/1
CAPSULE ORAL
Qty: 60 CAPSULE | Refills: 0 | Status: SHIPPED | OUTPATIENT
Start: 2021-05-26 | End: 2021-05-26 | Stop reason: ALTCHOICE

## 2021-05-26 RX ORDER — ISOTRETINOIN 30 MG/1
30 CAPSULE ORAL 2 TIMES DAILY
Qty: 60 CAPSULE | Refills: 0 | Status: SHIPPED | OUTPATIENT
Start: 2021-05-26 | End: 2021-06-25

## 2021-07-07 ENCOUNTER — OFFICE VISIT (OUTPATIENT)
Dept: DERMATOLOGY | Facility: CLINIC | Age: 31
End: 2021-07-07
Payer: COMMERCIAL

## 2021-07-07 DIAGNOSIS — L66.3 DISSECTING CELLULITIS OF SCALP: ICD-10-CM

## 2021-07-07 DIAGNOSIS — M25.551 PAIN OF BOTH HIP JOINTS: ICD-10-CM

## 2021-07-07 DIAGNOSIS — M25.552 PAIN OF BOTH HIP JOINTS: ICD-10-CM

## 2021-07-07 DIAGNOSIS — L70.0 ACNE VULGARIS: Primary | ICD-10-CM

## 2021-07-07 DIAGNOSIS — L81.9 POST-INFLAMMATORY PIGMENTARY CHANGES: ICD-10-CM

## 2021-07-07 PROCEDURE — 99214 PR OFFICE/OUTPT VISIT, EST, LEVL IV, 30-39 MIN: ICD-10-PCS | Mod: S$GLB,,, | Performed by: DERMATOLOGY

## 2021-07-07 PROCEDURE — 1126F PR PAIN SEVERITY QUANTIFIED, NO PAIN PRESENT: ICD-10-PCS | Mod: S$GLB,,, | Performed by: DERMATOLOGY

## 2021-07-07 PROCEDURE — 99999 PR PBB SHADOW E&M-EST. PATIENT-LVL II: ICD-10-PCS | Mod: PBBFAC,,, | Performed by: DERMATOLOGY

## 2021-07-07 PROCEDURE — 99214 OFFICE O/P EST MOD 30 MIN: CPT | Mod: S$GLB,,, | Performed by: DERMATOLOGY

## 2021-07-07 PROCEDURE — 1126F AMNT PAIN NOTED NONE PRSNT: CPT | Mod: S$GLB,,, | Performed by: DERMATOLOGY

## 2021-07-07 PROCEDURE — 99999 PR PBB SHADOW E&M-EST. PATIENT-LVL II: CPT | Mod: PBBFAC,,, | Performed by: DERMATOLOGY

## 2021-07-07 RX ORDER — ISOTRETINOIN 30 MG/1
30 CAPSULE ORAL 2 TIMES DAILY
Qty: 60 CAPSULE | Refills: 0 | Status: SHIPPED | OUTPATIENT
Start: 2021-07-07 | End: 2021-07-07

## 2021-07-07 RX ORDER — ISOTRETINOIN 40 MG/1
40 CAPSULE ORAL 2 TIMES DAILY
Qty: 60 CAPSULE | Refills: 0 | Status: SHIPPED | OUTPATIENT
Start: 2021-07-07 | End: 2021-07-16 | Stop reason: SDUPTHER

## 2021-07-16 DIAGNOSIS — L70.0 ACNE VULGARIS: ICD-10-CM

## 2021-07-19 RX ORDER — ISOTRETINOIN 40 MG/1
40 CAPSULE ORAL 2 TIMES DAILY
Qty: 60 CAPSULE | Refills: 0 | Status: SHIPPED | OUTPATIENT
Start: 2021-07-19 | End: 2021-08-18

## 2021-08-09 ENCOUNTER — TELEPHONE (OUTPATIENT)
Dept: DERMATOLOGY | Facility: CLINIC | Age: 31
End: 2021-08-09